# Patient Record
Sex: FEMALE | Race: WHITE
[De-identification: names, ages, dates, MRNs, and addresses within clinical notes are randomized per-mention and may not be internally consistent; named-entity substitution may affect disease eponyms.]

---

## 2017-09-13 ENCOUNTER — HOSPITAL ENCOUNTER (OUTPATIENT)
Dept: HOSPITAL 62 - RAD | Age: 67
End: 2017-09-13
Attending: INTERNAL MEDICINE
Payer: MEDICARE

## 2017-09-13 DIAGNOSIS — K22.2: ICD-10-CM

## 2017-09-13 DIAGNOSIS — R07.9: Primary | ICD-10-CM

## 2017-09-13 PROCEDURE — 71020: CPT

## 2017-09-13 NOTE — RADIOLOGY REPORT (SQ)
EXAM DESCRIPTION:  CHEST PA/LAT



COMPLETED DATE/TIME:  9/13/2017 9:58 am



REASON FOR STUDY:  CHEST PAIN/ESOPHAGEAL OBSTRUCTION



COMPARISON:  CT chest 11/1/2016

AP chest 5/10/2012



EXAM PARAMETERS:  NUMBER OF VIEWS: two views

TECHNIQUE: Digital Frontal and Lateral radiographic views of the chest acquired.

RADIATION DOSE: NA

LIMITATIONS: none



FINDINGS:  LUNGS AND PLEURA: No opacities, masses or pneumothorax. No pleural effusion.

MEDIASTINUM AND HILAR STRUCTURES: No masses or contour abnormalities.

HEART AND VASCULAR STRUCTURES: Heart normal size.  No evidence for failure.

BONES: No acute findings.

HARDWARE: Surgical clips right paratracheal region.

OTHER: No other significant finding.



IMPRESSION:  No acute findings



TECHNICAL DOCUMENTATION:  JOB ID:  [id number]

 2011 [employer]- All Rights Reserved

## 2017-09-19 ENCOUNTER — HOSPITAL ENCOUNTER (OUTPATIENT)
Dept: HOSPITAL 62 - RAD | Age: 67
End: 2017-09-19
Attending: INTERNAL MEDICINE
Payer: MEDICARE

## 2017-09-19 DIAGNOSIS — R07.9: Primary | ICD-10-CM

## 2017-09-19 LAB
ANION GAP SERPL CALC-SCNC: 11 MMOL/L (ref 5–19)
BASOPHILS # BLD AUTO: 0.1 10^3/UL (ref 0–0.2)
BASOPHILS NFR BLD AUTO: 0.6 % (ref 0–2)
BUN SERPL-MCNC: 15 MG/DL (ref 7–20)
CALCIUM: 9.5 MG/DL (ref 8.4–10.2)
CHLORIDE SERPL-SCNC: 105 MMOL/L (ref 98–107)
CO2 SERPL-SCNC: 25 MMOL/L (ref 22–30)
CREAT SERPL-MCNC: 1.08 MG/DL (ref 0.52–1.25)
EOSINOPHIL # BLD AUTO: 0 10^3/UL (ref 0–0.6)
EOSINOPHIL NFR BLD AUTO: 0.1 % (ref 0–6)
ERYTHROCYTE [DISTWIDTH] IN BLOOD BY AUTOMATED COUNT: 14.5 % (ref 11.5–14)
GLUCOSE SERPL-MCNC: 115 MG/DL (ref 75–110)
HCT VFR BLD CALC: 36.3 % (ref 36–47)
HGB BLD-MCNC: 12.5 G/DL (ref 12–15.5)
HGB HCT DIFFERENCE: 1.2
LYMPHOCYTES # BLD AUTO: 0.7 10^3/UL (ref 0.5–4.7)
LYMPHOCYTES NFR BLD AUTO: 7.6 % (ref 13–45)
MCH RBC QN AUTO: 29.3 PG (ref 27–33.4)
MCHC RBC AUTO-ENTMCNC: 34.6 G/DL (ref 32–36)
MCV RBC AUTO: 85 FL (ref 80–97)
MONOCYTES # BLD AUTO: 0.4 10^3/UL (ref 0.1–1.4)
MONOCYTES NFR BLD AUTO: 4.6 % (ref 3–13)
NEUTROPHILS # BLD AUTO: 7.9 10^3/UL (ref 1.7–8.2)
NEUTS SEG NFR BLD AUTO: 87.1 % (ref 42–78)
POTASSIUM SERPL-SCNC: 4.6 MMOL/L (ref 3.6–5)
RBC # BLD AUTO: 4.29 10^6/UL (ref 3.72–5.28)
SODIUM SERPL-SCNC: 141.4 MMOL/L (ref 137–145)
WBC # BLD AUTO: 9.1 10^3/UL (ref 4–10.5)

## 2017-09-19 PROCEDURE — 80048 BASIC METABOLIC PNL TOTAL CA: CPT

## 2017-09-19 PROCEDURE — 36415 COLL VENOUS BLD VENIPUNCTURE: CPT

## 2017-09-19 PROCEDURE — 82565 ASSAY OF CREATININE: CPT

## 2017-09-19 PROCEDURE — 71260 CT THORAX DX C+: CPT

## 2017-09-19 PROCEDURE — 85025 COMPLETE CBC W/AUTO DIFF WBC: CPT

## 2017-09-19 NOTE — RADIOLOGY REPORT (SQ)
EXAM DESCRIPTION:  CT CHEST WITH



COMPLETED DATE/TIME:  9/19/2017 9:04 am



REASON FOR STUDY:  CHEST PAIN (R07.9) R07.9  CHEST PAIN, UNSPECIFIED



COMPARISON:  11/1/2016.



TECHNIQUE:  CT scan of the chest performed using helical scanning technique with dynamic intravenous 
contrast injection.  Images reviewed with lung, soft tissue and bone windows.  Reconstructed coronal 
and sagittal MPR images reviewed.  All images stored on PACS.

All CT scanners at this facility use dose modulation, iterative reconstruction, and/or weight based d
osing when appropriate to reduce radiation dose to as low as reasonably achievable (ALARA).

[facility]: Dose Right  [facility]: CareDose    [facility]: Dose Right    [facility]: Teradose 4D    [facility]: Smart Technologies



CONTRAST TYPE AND DOSE:  contrast/concentration: Isovue 370.00 mg/ml; Total Contrast Delivered: 80.0 
ml; Total Saline Delivered: 55.0 ml



RENAL FUNCTION:  Creatinine 1.1.



RADIATION DOSE:  Up-to-date CT equipment and radiation dose reduction techniques were employed. CTDIv
ol: 5.2 mGy. DLP: 190 mGy-cm. .



LIMITATIONS:  None.



FINDINGS:  LUNGS AND PLEURA: No opacities, nodules, masses.  No pneumothorax. No effusions.

HILAR AND MEDIASTINAL STRUCTURES: No identified masses or abnormal nodes.

HEART AND VASCULAR STRUCTURES: No aneurysm or dissection.  No central pulmonary emboli.  No pericardi
al effusion.

HARDWARE: None in the chest.

UPPER ABDOMEN: No significant findings.  Limited exam.

THYROID AND OTHER SOFT TISSUES: No masses.  No adenopathy.

BONES: No significant finding.

OTHER: No other significant finding.



IMPRESSION:  NORMAL CT OF THE CHEST WITH IV CONTRAST.



TECHNICAL DOCUMENTATION:  JOB ID:  [id number]

Quality ID # 436: Final reports with documentation of one or more dose reduction techniques (e.g., Au
tomated exposure control, adjustment of the mA and/or kV according to patient size, use of iterative 
reconstruction technique)

 2011 [employer]- All Rights Reserved

## 2017-11-21 ENCOUNTER — HOSPITAL ENCOUNTER (EMERGENCY)
Dept: HOSPITAL 62 - ER | Age: 67
Discharge: HOME | End: 2017-11-21
Payer: MEDICARE

## 2017-11-21 VITALS — DIASTOLIC BLOOD PRESSURE: 72 MMHG | SYSTOLIC BLOOD PRESSURE: 131 MMHG

## 2017-11-21 DIAGNOSIS — S51.012A: ICD-10-CM
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
## 2017-11-21 DIAGNOSIS — S30.0XXA: ICD-10-CM

## 2017-11-21 DIAGNOSIS — S50.02XA: Primary | ICD-10-CM
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
## 2017-11-21 PROCEDURE — 99283 EMERGENCY DEPT VISIT LOW MDM: CPT

## 2017-11-21 PROCEDURE — 73080 X-RAY EXAM OF ELBOW: CPT

## 2017-11-21 PROCEDURE — 72220 X-RAY EXAM SACRUM TAILBONE: CPT

## 2017-11-21 NOTE — RADIOLOGY REPORT (SQ)
EXAM DESCRIPTION:  SACRUM AND COCCYX



COMPLETED DATE/TIME:  11/21/2017 3:48 pm



REASON FOR STUDY:  fall/pain



COMPARISON:  None.



NUMBER OF VIEWS:  Three views.



TECHNIQUE:  AP, lateral, and tilt views of the sacrum and coccyx.



LIMITATIONS:  None.



FINDINGS:  MINERALIZATION: Normal.

BONES: No acute fracture or dislocation.  No worrisome bone lesions.

SOFT TISSUES: No soft tissue swelling.  No foreign body.

OTHER: Lower lumbar degenerative disc changes.



IMPRESSION:  No acute abnormality in the sacrum and coccyx.



TECHNICAL DOCUMENTATION:  JOB ID:  [id number]

 2011 [employer]- All Rights Reserved

## 2017-11-21 NOTE — RADIOLOGY REPORT (SQ)
EXAM DESCRIPTION:  ELBOW LEFT OVER 2 VIEWS



COMPLETED DATE/TIME:  11/21/2017 3:48 pm



REASON FOR STUDY:  fall/pain



COMPARISON:  None.



NUMBER OF VIEWS:  Four views.



TECHNIQUE:  AP, lateral, and both oblique radiographic images acquired of the left elbow.



LIMITATIONS:  None.



FINDINGS:  MINERALIZATION: Normal.

BONES: No acute fracture or dislocation.  No worrisome bone lesions.

JOINT: No effusion.

SOFT TISSUES: No soft tissue swelling.  No foreign body.

OTHER: No other significant finding.



IMPRESSION:  NEGATIVE STUDY OF THE LEFT ELBOW. NO RADIOGRAPHIC EVIDENCE OF ACUTE INJURY.



TECHNICAL DOCUMENTATION:  JOB ID:  [id number]

 2011 [employer]- All Rights Reserved

## 2017-11-21 NOTE — ER DOCUMENT REPORT
ED Fall





- General


Chief Complaint: Fall Injury


Stated Complaint: FALL


Time Seen by Provider: 17 15:18


Mode of Arrival: Ambulatory


Information source: Patient


Notes: 





Patient states that she had a fall at home.  She states she lost her balance 

and is not sure exactly what made her lose her balance.  She denies any loss of 

consciousness.  States she was not dizzy.  She states she suffered an injury to 

her elbow and coccyx as she was falling because she had several objects in the 

kitchen.  The pain is constant.  It does not radiate.  It is worse with 

movement and better with rest.  It is a dull aching pain in both the elbow and 

coccyx.  Symptoms are moderate.


TRAVEL OUTSIDE OF THE [place] IN LAST 30 DAYS: No





- Related data


Allergies/Adverse Reactions: 


 





Sulfa (Sulfonamide Antibiotics) Allergy (Verified 17 15:02)


 


dial soap Allergy (Mild, Uncoded 17 15:02)


 Hives








Home Medications: 


 Current Home Medications





Omeprazole [Omeprazole] 40 mg PO BID 17 [History]











Past Medical History





- General


Information source: Patient





- Social History


Smoking Status: Never Smoker


Chew tobacco use (# tins/day): No


Frequency of alcohol use: Occasional


Drug Abuse: None


Family History: Reviewed & Not Pertinent


Patient has suicidal ideation: No


Patient has homicidal ideation: No





- Past Medical History


Cardiac Medical History: 


   Denies: Hx Heart Attack, Hx Hypertension


Pulmonary Medical History: Reports: Hx Asthma - medicated, Hx Bronchitis, Hx 

COPD


   Denies: Hx Tuberculosis


Neurological Medical History: Denies: Hx Cerebrovascular Accident, Hx Seizures


Renal/ Medical History: Denies: Hx Peritoneal Dialysis


GI Medical History: Denies: Hx Hepatitis, Hx Hiatal Hernia, Hx Ulcer


Skin Medical History: Reports Hx Eczema


Infectious Medical History: Denies: Hx Hepatitis


Past Surgical History: Reports: Hx  Section - x1, Hx Hysterectomy, Hx 

Orthopedic Surgery - 5th metatarsal removed.  Denies: Hx Mastectomy, Hx Open 

Heart Surgery, Hx Pacemaker





- Immunizations


Hx Pneumococcal Vaccination: 05/10/12





Review of Systems





- Review of Systems


Constitutional: denies: Chills, Fever


Cardiovascular: denies: Chest pain, Palpitations


Respiratory: denies: Cough, Short of breath


-: Yes All other systems reviewed and negative





Physical Exam





- Vital signs


Vitals: 





 











Temp Pulse Resp BP Pulse Ox


 


 97.8 F   90   18   113/71   97 


 


 11/21/17 15:02  17 15:02  17 15:02  17 15:02  17 15:02











Interpretation: Normal





- General


General appearance: Appears well, Alert


In distress: None





- HEENT


Head: Normocephalic, Atraumatic


Eyes: Normal


Pupils: PERRL





- Respiratory


Respiratory status: No respiratory distress


Chest status: Nontender


Breath sounds: Normal


Chest palpation: Normal





- Cardiovascular


Rhythm: Regular


Heart sounds: Normal auscultation


Murmur: No





- Abdominal


Inspection: Normal


Distension: No distension


Bowel sounds: Normal


Tenderness: Nontender


Organomegaly: No organomegaly





- Back


Back: Normal, Tender - Patient's sacrum and coccyx are diffusely tender to 

palpation





- Extremities


General upper extremity: Normal ROM, Normal temperature, Other - Left elbow has 

full range of motion but is tender to palpation diffusely.


General lower extremity: Normal inspection, Nontender, Normal color, Normal ROM

, Normal temperature, Normal weight bearing.  No: Sherry's sign





- Neurological


Neuro grossly intact: Yes


Cognition: Normal


Orientation: AAOx4


[place] Coma Scale Eye Opening: Spontaneous


[place] Coma Scale Verbal: Oriented


[employer] Coma Scale Motor: Obeys Commands


[place] Coma Scale Total: 15


Speech: Normal


Motor strength normal: LUE, RUE, LLE, RLE


Sensory: Normal





- Psychological


Associated symptoms: Normal affect, Normal mood





- Skin


Skin Temperature: Warm


Skin Moisture: Dry


Skin Color: Other - Patient has a skin tear to the left lateral elbow.  

Bleeding is controlled.





Course





- Vital Signs


Vital signs: 





 











Temp Pulse Resp BP Pulse Ox


 


 97.8 F   90   18   113/71   97 


 


 17 15:02  17 15:02  17 15:02  17 15:02  17 15:02














- Diagnostic Test


Radiology reviewed: Image reviewed, Reports reviewed - I reviewed patient's x-

rays and there is no evidence of fracture or dislocation of the elbow sacrum or 

coccyx

## 2018-03-21 ENCOUNTER — HOSPITAL ENCOUNTER (INPATIENT)
Dept: HOSPITAL 62 - ER | Age: 68
LOS: 6 days | Discharge: HOME HEALTH | DRG: 194 | End: 2018-03-27
Attending: STUDENT IN AN ORGANIZED HEALTH CARE EDUCATION/TRAINING PROGRAM | Admitting: STUDENT IN AN ORGANIZED HEALTH CARE EDUCATION/TRAINING PROGRAM
Payer: MEDICARE

## 2018-03-21 DIAGNOSIS — I10: ICD-10-CM

## 2018-03-21 DIAGNOSIS — R07.89: ICD-10-CM

## 2018-03-21 DIAGNOSIS — J18.1: Primary | ICD-10-CM

## 2018-03-21 DIAGNOSIS — R40.4: ICD-10-CM

## 2018-03-21 DIAGNOSIS — Z87.891: ICD-10-CM

## 2018-03-21 DIAGNOSIS — Z79.51: ICD-10-CM

## 2018-03-21 DIAGNOSIS — N17.9: ICD-10-CM

## 2018-03-21 DIAGNOSIS — M54.9: ICD-10-CM

## 2018-03-21 LAB
ABSOLUTE LYMPHOCYTES# (MANUAL): 1.4 10^3/UL (ref 0.5–4.7)
ABSOLUTE MONOCYTES # (MANUAL): 1.4 10^3/UL (ref 0.1–1.4)
ABSOLUTE NEUTROPHILS# (MANUAL): 31 10^3/UL (ref 1.7–8.2)
ADD MANUAL DIFF: YES
ALBUMIN SERPL-MCNC: 4.2 G/DL (ref 3.5–5)
ALP SERPL-CCNC: 94 U/L (ref 38–126)
ALT SERPL-CCNC: 30 U/L (ref 9–52)
ANION GAP SERPL CALC-SCNC: 10 MMOL/L (ref 5–19)
AST SERPL-CCNC: 25 U/L (ref 14–36)
BASOPHILS NFR BLD MANUAL: 0 % (ref 0–2)
BILIRUB DIRECT SERPL-MCNC: 0.4 MG/DL (ref 0–0.4)
BILIRUB SERPL-MCNC: 0.5 MG/DL (ref 0.2–1.3)
BUN SERPL-MCNC: 32 MG/DL (ref 7–20)
CALCIUM: 9.9 MG/DL (ref 8.4–10.2)
CHLORIDE SERPL-SCNC: 101 MMOL/L (ref 98–107)
CK MB SERPL-MCNC: 1.54 NG/ML (ref ?–4.55)
CO2 SERPL-SCNC: 26 MMOL/L (ref 22–30)
EOSINOPHIL NFR BLD MANUAL: 1 % (ref 0–6)
ERYTHROCYTE [DISTWIDTH] IN BLOOD BY AUTOMATED COUNT: 13.8 % (ref 11.5–14)
GLUCOSE SERPL-MCNC: 89 MG/DL (ref 75–110)
HCT VFR BLD CALC: 40.3 % (ref 36–47)
HGB BLD-MCNC: 13.2 G/DL (ref 12–15.5)
MCH RBC QN AUTO: 28.9 PG (ref 27–33.4)
MCHC RBC AUTO-ENTMCNC: 32.7 G/DL (ref 32–36)
MCV RBC AUTO: 88 FL (ref 80–97)
MONOCYTES % (MANUAL): 4 % (ref 3–13)
OVALOCYTES BLD QL SMEAR: SLIGHT
PLATELET # BLD: 355 10^3/UL (ref 150–450)
PLATELET COMMENT: ADEQUATE
POIKILOCYTOSIS BLD QL SMEAR: SLIGHT
POTASSIUM SERPL-SCNC: 4.3 MMOL/L (ref 3.6–5)
PROT SERPL-MCNC: 7.5 G/DL (ref 6.3–8.2)
RBC # BLD AUTO: 4.56 10^6/UL (ref 3.72–5.28)
SEGMENTED NEUTROPHILS % (MAN): 91 % (ref 42–78)
SODIUM SERPL-SCNC: 136.7 MMOL/L (ref 137–145)
TOTAL CELLS COUNTED BLD: 100
TOXIC GRANULES BLD QL SMEAR: SLIGHT
TROPONIN I SERPL-MCNC: < 0.012 NG/ML
VARIANT LYMPHS NFR BLD MANUAL: 2 % (ref 13–45)
WBC # BLD AUTO: 34.1 10^3/UL (ref 4–10.5)
WBC TOXIC VACUOLES BLD QL SMEAR: PRESENT

## 2018-03-21 PROCEDURE — 96366 THER/PROPH/DIAG IV INF ADDON: CPT

## 2018-03-21 PROCEDURE — 83735 ASSAY OF MAGNESIUM: CPT

## 2018-03-21 PROCEDURE — 87070 CULTURE OTHR SPECIMN AEROBIC: CPT

## 2018-03-21 PROCEDURE — 87040 BLOOD CULTURE FOR BACTERIA: CPT

## 2018-03-21 PROCEDURE — 94668 MNPJ CHEST WALL SBSQ: CPT

## 2018-03-21 PROCEDURE — 99285 EMERGENCY DEPT VISIT HI MDM: CPT

## 2018-03-21 PROCEDURE — 84484 ASSAY OF TROPONIN QUANT: CPT

## 2018-03-21 PROCEDURE — 96365 THER/PROPH/DIAG IV INF INIT: CPT

## 2018-03-21 PROCEDURE — 70450 CT HEAD/BRAIN W/O DYE: CPT

## 2018-03-21 PROCEDURE — 93005 ELECTROCARDIOGRAM TRACING: CPT

## 2018-03-21 PROCEDURE — 94640 AIRWAY INHALATION TREATMENT: CPT

## 2018-03-21 PROCEDURE — 80048 BASIC METABOLIC PNL TOTAL CA: CPT

## 2018-03-21 PROCEDURE — 87077 CULTURE AEROBIC IDENTIFY: CPT

## 2018-03-21 PROCEDURE — 82553 CREATINE MB FRACTION: CPT

## 2018-03-21 PROCEDURE — 80053 COMPREHEN METABOLIC PANEL: CPT

## 2018-03-21 PROCEDURE — 71045 X-RAY EXAM CHEST 1 VIEW: CPT

## 2018-03-21 PROCEDURE — 96361 HYDRATE IV INFUSION ADD-ON: CPT

## 2018-03-21 PROCEDURE — 71275 CT ANGIOGRAPHY CHEST: CPT

## 2018-03-21 PROCEDURE — 82550 ASSAY OF CK (CPK): CPT

## 2018-03-21 PROCEDURE — 87205 SMEAR GRAM STAIN: CPT

## 2018-03-21 PROCEDURE — 36415 COLL VENOUS BLD VENIPUNCTURE: CPT

## 2018-03-21 PROCEDURE — 94667 MNPJ CHEST WALL 1ST: CPT

## 2018-03-21 PROCEDURE — 93010 ELECTROCARDIOGRAM REPORT: CPT

## 2018-03-21 PROCEDURE — 85025 COMPLETE CBC W/AUTO DIFF WBC: CPT

## 2018-03-21 PROCEDURE — 74175 CTA ABDOMEN W/CONTRAST: CPT

## 2018-03-21 PROCEDURE — 96375 TX/PRO/DX INJ NEW DRUG ADDON: CPT

## 2018-03-21 PROCEDURE — 93880 EXTRACRANIAL BILAT STUDY: CPT

## 2018-03-21 PROCEDURE — 84100 ASSAY OF PHOSPHORUS: CPT

## 2018-03-21 PROCEDURE — 85027 COMPLETE CBC AUTOMATED: CPT

## 2018-03-21 RX ADMIN — MORPHINE SULFATE PRN MG: 10 INJECTION INTRAMUSCULAR; INTRAVENOUS; SUBCUTANEOUS at 19:47

## 2018-03-21 RX ADMIN — HYDROCODONE BITARTRATE AND ACETAMINOPHEN PRN TAB: 5; 325 TABLET ORAL at 21:47

## 2018-03-21 RX ADMIN — GUAIFENESIN PRN MG: 200 SOLUTION ORAL at 21:47

## 2018-03-21 RX ADMIN — AZITHROMYCIN MONOHYDRATE SCH MG: 500 INJECTION, POWDER, LYOPHILIZED, FOR SOLUTION INTRAVENOUS at 22:23

## 2018-03-21 RX ADMIN — IPRATROPIUM BROMIDE AND ALBUTEROL SULFATE SCH ML: 2.5; .5 SOLUTION RESPIRATORY (INHALATION) at 19:48

## 2018-03-21 RX ADMIN — Medication SCH ML: at 21:50

## 2018-03-21 RX ADMIN — SODIUM CHLORIDE PRN ML: 0.45 INJECTION, SOLUTION INTRAVENOUS at 21:47

## 2018-03-21 NOTE — ER DOCUMENT REPORT
ED Medical Screen (RME)





- General


Chief Complaint: Back Pain


Stated Complaint: BACK PAIN


Time Seen by Provider: 18 14:46


Notes: 





Patient states she is having severe left-sided chest pain that started when she 

rolled over in bed.


TRAVEL OUTSIDE OF THE [place] IN LAST 30 DAYS: No





- Related Data


Allergies/Adverse Reactions: 


 





Sulfa (Sulfonamide Antibiotics) Allergy (Verified 18 14:24)


 


dial soap Allergy (Mild, Uncoded 18 14:24)


 Hives











Past Medical History





- Past Medical History


Cardiac Medical History: 


   Denies: Hx Heart Attack, Hx Hypertension


Pulmonary Medical History: Reports: Hx Asthma - medicated, Hx Bronchitis, Hx 

COPD


   Denies: Hx Tuberculosis


Neurological Medical History: Denies: Hx Cerebrovascular Accident, Hx Seizures


Renal/ Medical History: Denies: Hx Peritoneal Dialysis


GI Medical History: Denies: Hx Hepatitis, Hx Hiatal Hernia, Hx Ulcer


Skin Medical History: Reports Hx Eczema


Infectious Medical History: Denies: Hx Hepatitis


Past Surgical History: Reports: Hx  Section - x1, Hx Hysterectomy, Hx 

Orthopedic Surgery - 5th metatarsal removed.  Denies: Hx Mastectomy, Hx Open 

Heart Surgery, Hx Pacemaker





Physical Exam





- Vital signs


Vitals: 





 











Temp Pulse Resp BP Pulse Ox


 


 98.4 F   112 H  28 H  109/71   95 


 


 18 14:35  18 14:35  18 14:35  18 14:35  18 14:35














Course





- Vital Signs


Vital signs: 





 











Temp Pulse Resp BP Pulse Ox


 


 98.4 F   112 H  28 H  109/71   95 


 


 18 14:35  18 14:35  18 14:35  18 14:35  18 14:35

## 2018-03-21 NOTE — RADIOLOGY REPORT (SQ)
EXAM DESCRIPTION:  CHEST SINGLE VIEW



COMPLETED DATE/TIME:  3/21/2018 3:17 pm



REASON FOR STUDY:  CHEST   BACK PAIN, LEFT SIDE



COMPARISON:  CT chest 9/19/2017, 11/1/2016

Two-view chest 9/13/2017



EXAM PARAMETERS:  NUMBER OF VIEWS: One view.

TECHNIQUE: Single frontal radiographic view of the chest acquired.

RADIATION DOSE: NA

LIMITATIONS: None.



FINDINGS:  LUNGS AND PLEURA: Left basilar airspace disease is present, worrisome for pneumonia.

Lungs are otherwise hyperinflated and hyperlucent but free of focal infiltrates.  No pleural effusion
.  No pneumothorax.

MEDIASTINUM AND HILAR STRUCTURES: No masses.  Contour normal.

HEART AND VASCULAR STRUCTURES: Heart normal in size.  Normal vasculature.

BONES: No acute findings.

HARDWARE: None in the chest.

OTHER: No other significant finding.



IMPRESSION:  Left lower lobe airspace disease worrisome for pneumonia



TECHNICAL DOCUMENTATION:  JOB ID:  [id number]

 2011 [employer]- All Rights Reserved



Reading location - IP/workstation name: [email or web address]

## 2018-03-21 NOTE — ER DOCUMENT REPORT
ED Cardiac





- General


Chief Complaint: Back Pain


Stated Complaint: BACK PAIN


Time Seen by Provider: 18 14:46


Mode of Arrival: Wheelchair


Information source: Patient


TRAVEL OUTSIDE OF THE [place] IN LAST 30 DAYS: No





- HPI


Patient complains to provider of: Chest pain - LEFT POSTERIOR, Shortness of 

breath, Other - BACK PAIN


Was the onset of pain: Sudden


When did pain begin: 0300


Is the pain a: New problem


Chest pain location: Back - LOWER LEFT


Quality of pain: Severe, Tearing


Chest pain radiation location: Back


Severity now: Moderate


Severity at worst: Severe


Chest pain precipitating factors: At Rest - GOT UP FROM BED TO GO TO BATHROOM


Cardiac risk factors: Hypertension, Smoker - FORMERLY


Positive cardiac history: Yes


Associated symptoms: Shortness of breath.  denies: Diaphoresis, Nausea/vomiting


Exacerbated by: Deep breaths, Torso movement


Relieved by: Nothing


Similar symptoms previously: No


Recently seen / treated by doctor: No





- Related Data


Allergies/Adverse Reactions: 


 





Sulfa (Sulfonamide Antibiotics) Allergy (Verified 18 14:24)


 


dial soap Allergy (Mild, Uncoded 18 14:24)


 Hives











Past Medical History





- Social History


Smoking Status: Unknown if Ever Smoked


Frequency of alcohol use: None


Drug Abuse: None


Family History: Reviewed & Not Pertinent


Patient has suicidal ideation: No


Patient has homicidal ideation: No





- Past Medical History


Cardiac Medical History: 


   Denies: Hx Heart Attack, Hx Hypertension


Pulmonary Medical History: Reports: Hx Asthma - medicated, Hx Bronchitis, Hx 

COPD


   Denies: Hx Tuberculosis


Neurological Medical History: Denies: Hx Cerebrovascular Accident, Hx Seizures


Renal/ Medical History: Denies: Hx Peritoneal Dialysis


GI Medical History: Denies: Hx Hepatitis, Hx Hiatal Hernia, Hx Ulcer


Skin Medical History: Reports Hx Eczema


Infectious Medical History: Denies: Hx Hepatitis


Past Surgical History: Reports: Hx  Section - x1, Hx Hysterectomy, Hx 

Orthopedic Surgery - 5th metatarsal removed.  Denies: Hx Mastectomy, Hx Open 

Heart Surgery, Hx Pacemaker





- Immunizations


Hx Pneumococcal Vaccination: 05/10/12





Physical Exam





- Vital signs


Vitals: 


 











Temp Pulse Resp BP Pulse Ox


 


 98.4 F   112 H  28 H  109/71   95 


 


 18 14:35  18 14:35  18 14:35  18 14:35  18 14:35











Interpretation: Tachycardic, Tachypneic.  No: Hypertensive





- General


General appearance: Appears well, Alert


In distress: None





- HEENT


Head: Normocephalic


Eyes: Normal


Conjunctiva: Normal


Ears: Normal


Nasal: Normal


Mouth/Lips: Normal


Mucous membranes: Normal


Neck: Normal





- Respiratory


Respiratory status: Tachypnea


Chest status: Nontender


Breath sounds: Wheezing - ALL FIELDS





- Cardiovascular


Rhythm: Regular


Heart sounds: Normal auscultation


Murmur: No





- Abdominal


Inspection: Normal


Distension: No distension





- Back


Back: Normal, Nontender





- Extremities


General upper extremity: Normal inspection


General lower extremity: Normal inspection.  No: Tender, Edema





- Neurological


Neuro grossly intact: Yes


Cognition: Normal


Orientation: AAOx4





- Psychological


Associated symptoms: Normal affect, Normal mood





- Skin


Skin Temperature: Warm


Skin Moisture: Dry


Skin Color: Normal


Skin Turgor: Elastic





Course





- Vital Signs


Vital signs: 


 











Temp Pulse Resp BP Pulse Ox


 


 98.4 F   112 H  25 H  105/71   97 


 


 18 14:35  18 14:35  18 18:00  18 17:01  18 18:00














- Laboratory


Result Diagrams: 


 18 14:55





 18 14:55


Laboratory results interpreted by me: 


 











  18





  14:55 14:55


 


WBC  34.1 H* 


 


Seg Neuts % (Manual)  91 H 


 


Lymphocytes % (Manual)  2 L 


 


Abs Neuts (Manual)  31.0 H 


 


Sodium   136.7 L


 


BUN   32 H


 


Creatinine   1.30 H


 


Est GFR ( Amer)   49 L


 


Est GFR (Non-Af Amer)   41 L














- Diagnostic Test


Radiology reviewed: Image reviewed, Reports reviewed





- EKG Interpretation by Me


EKG shows normal: Sinus rhythm, Axis, Intervals, QRS Complexes, ST-T Waves


Rate: Tachycardia


P Waves: LAE





- Consults


  ** DR. JEFFREY


Time consulted: 16:52


Consulted provider: will come to ER





Discharge





- Discharge


Clinical Impression: 


 Pneumonia, Pleuritic chest pain





Condition: Fair


Disposition: ADMITTED AS INPATIENT


Admitting Provider: Hospitalist


Unit Admitted: Telemetry

## 2018-03-21 NOTE — RADIOLOGY REPORT (SQ)
EXAM DESCRIPTION:  CTA CHEST; CTA ABDOMEN



COMPLETED DATE/TIME:  3/21/2018 4:01 pm



REASON FOR STUDY:  CHEST   BACK PAIN, R/O AORTIC DISSECTION; R/O AORTIC DISSECTION



COMPARISON:  CT chest 9/19/2017, 11/1/2016

CT abdomen pelvis 9/12/2016

Two-view chest 3/21/2018



CONTRAST TYPE AND DOSE:  contrast/concentration: Isovue 370.00 mg/ml; Total Contrast Delivered: 78.0 
ml; Total Saline Delivered: 90.0 ml



RENAL FUNCTION:  Creatinine 1.3



TECHNIQUE:  CT angio of the chest performed using helical scanning technique with dynamic intravenous
 contrast injection.  Images reviewed with lung, soft tissue and bone windows. Reconstructed coronal 
and sagittal MPR images reviewed through the pulmonary arteries and thoracic aorta.  All images store
d on PACS.

CT angio of the abdomen and pelvis performed with intravenous and without oral contrastusing helical 
scanning technique with dynamic intravenous contrast injection.  Images reviewed with lung, soft tiss
ue and bone windows.  Reconstructed coronal and sagittal MPR images reviewed.  Delayed images for clinton
luation of the urinary system also acquired and evaluated. All images stored on PACS.

All CT scanners at this facility use dose modulation, iterative reconstruction, and/or weight based d
osing when appropriate to reduce radiation dose to as low as reasonably achievable (ALARA).

[facility]: Dose Right  [facility]: CareDose    [facility]: Dose Right    [facility]: Teradose 4D    [facility]: [employer]



RADIATION DOSE:  CT Rad equipment meets quality standard of care and radiation dose reduction techniq
ues were employed. CTDIvol: 10.5 - 16.1 mGy. DLP: 1115 mGy-cm. .



LIMITATIONS:  None.



FINDINGS:  CHEST:

LUNGS AND PLEURA: There is dense consolidation in the left lower lobe just above the hemidiaphragm fr
om pneumonia.  Lungs are otherwise hyperinflated and hyperlucent from obstructive disease.  No pleura
l effusions.  No pneumothorax.

HILAR AND MEDIASTINAL STRUCTURES: No bulky adenopathy.  Airways are patent.  No mediastinal mass.

HEART AND VASCULAR STRUCTURES: No thoracic aortic aneurysm or dissection.  No central pulmonary embol
i.  No pericardial effusion.  No emboli to the pulmonary arteries.

HARDWARE: None.

THYROID AND OTHER SOFT TISSUES: No masses.  No adenopathy.

BONES: No significant finding.

OTHER: No other significant finding.

ABDOMEN AND PELVIS:

LIVER: Normal size. No masses.  No dilated ducts.

SPLEEN: Normal size. No focal lesions.

PANCREAS: No masses. No significant calcifications. No adjacent inflammation or peripancreatic fluid 
collections. Pancreatic duct not dilated.

GALLBLADDER: No identified stones by CT criteria. No inflammatory changes to suggest cholecystitis.

ADRENAL GLANDS: No significant masses or asymmetry.

KIDNEYS AND URETERS:  Bilateral renal cortical thinning from medical renal disease.  No solid masses.
 No significant calcification. No hydronephrosis or hydroureter.

AORTA AND VESSELS: No abdominal aortic aneurysm. No dissection. Renal arteries, celiac without stenos
is.  Greater than 50% proximal SMA stenosis is evident on the sagittal reconstructions.

RETROPERITONEUM: No retroperitoneal adenopathy, hemorrhage or masses.

BOWEL AND PERITONEAL CAVITY: No masses or inflammatory changes. No free fluid or peritoneal masses.  
No oral contrast.  No gross evidence of bowel obstruction.  Scattered descending and sigmoid colon di
verticuli.

APPENDIX: Normal.

ABDOMINAL WALL: No masses. No hernias.

PELVIS: Incompletely included in the field of view.  No gross masses or adenopathy.

BONES: No acute fracture.  Degenerative vertebral body endplate changes at L4-5 and L5-S1

OTHER: No other significant finding.



IMPRESSION:  No CT angio evidence of acute pulmonary emboli.

No CT angio evidence of thoracic or abdominal aortic dissection.  Greater than 50% narrowing proximal
 SMA.



TECHNICAL DOCUMENTATION:  JOB ID:  [id number]

Quality ID # 436: Final reports with documentation of one or more dose reduction techniques (e.g., Au
tomated exposure control, adjustment of the mA and/or kV according to patient size, use of iterative 
reconstruction technique)

 2011 [employer]- All Rights Reserved



Reading location - IP/workstation name: [facility]

## 2018-03-21 NOTE — RADIOLOGY REPORT (SQ)
EXAM DESCRIPTION:  CTA CHEST; CTA ABDOMEN



COMPLETED DATE/TIME:  3/21/2018 4:01 pm



REASON FOR STUDY:  CHEST   BACK PAIN, R/O AORTIC DISSECTION; R/O AORTIC DISSECTION



COMPARISON:  CT chest 9/19/2017, 11/1/2016

CT abdomen pelvis 9/12/2016

Two-view chest 3/21/2018



CONTRAST TYPE AND DOSE:  contrast/concentration: Isovue 370.00 mg/ml; Total Contrast Delivered: 78.0 
ml; Total Saline Delivered: 90.0 ml



RENAL FUNCTION:  Creatinine 1.3



TECHNIQUE:  CT angio of the chest performed using helical scanning technique with dynamic intravenous
 contrast injection.  Images reviewed with lung, soft tissue and bone windows. Reconstructed coronal 
and sagittal MPR images reviewed through the pulmonary arteries and thoracic aorta.  All images store
d on PACS.

CT angio of the abdomen and pelvis performed with intravenous and without oral contrastusing helical 
scanning technique with dynamic intravenous contrast injection.  Images reviewed with lung, soft tiss
ue and bone windows.  Reconstructed coronal and sagittal MPR images reviewed.  Delayed images for clinton
luation of the urinary system also acquired and evaluated. All images stored on PACS.

All CT scanners at this facility use dose modulation, iterative reconstruction, and/or weight based d
osing when appropriate to reduce radiation dose to as low as reasonably achievable (ALARA).

[facility]: Dose Right  [facility]: CareDose    [facility]: Dose Right    [facility]: Teradose 4D    [facility]: [employer]



RADIATION DOSE:  CT Rad equipment meets quality standard of care and radiation dose reduction techniq
ues were employed. CTDIvol: 10.5 - 16.1 mGy. DLP: 1115 mGy-cm. .



LIMITATIONS:  None.



FINDINGS:  CHEST:

LUNGS AND PLEURA: There is dense consolidation in the left lower lobe just above the hemidiaphragm fr
om pneumonia.  Lungs are otherwise hyperinflated and hyperlucent from obstructive disease.  No pleura
l effusions.  No pneumothorax.

HILAR AND MEDIASTINAL STRUCTURES: No bulky adenopathy.  Airways are patent.  No mediastinal mass.

HEART AND VASCULAR STRUCTURES: No thoracic aortic aneurysm or dissection.  No central pulmonary embol
i.  No pericardial effusion.  No emboli to the pulmonary arteries.

HARDWARE: None.

THYROID AND OTHER SOFT TISSUES: No masses.  No adenopathy.

BONES: No significant finding.

OTHER: No other significant finding.

ABDOMEN AND PELVIS:

LIVER: Normal size. No masses.  No dilated ducts.

SPLEEN: Normal size. No focal lesions.

PANCREAS: No masses. No significant calcifications. No adjacent inflammation or peripancreatic fluid 
collections. Pancreatic duct not dilated.

GALLBLADDER: No identified stones by CT criteria. No inflammatory changes to suggest cholecystitis.

ADRENAL GLANDS: No significant masses or asymmetry.

KIDNEYS AND URETERS:  Bilateral renal cortical thinning from medical renal disease.  No solid masses.
 No significant calcification. No hydronephrosis or hydroureter.

AORTA AND VESSELS: No abdominal aortic aneurysm. No dissection. Renal arteries, celiac without stenos
is.  Greater than 50% proximal SMA stenosis is evident on the sagittal reconstructions.

RETROPERITONEUM: No retroperitoneal adenopathy, hemorrhage or masses.

BOWEL AND PERITONEAL CAVITY: No masses or inflammatory changes. No free fluid or peritoneal masses.  
No oral contrast.  No gross evidence of bowel obstruction.  Scattered descending and sigmoid colon di
verticuli.

APPENDIX: Normal.

ABDOMINAL WALL: No masses. No hernias.

PELVIS: Incompletely included in the field of view.  No gross masses or adenopathy.

BONES: No acute fracture.  Degenerative vertebral body endplate changes at L4-5 and L5-S1

OTHER: No other significant finding.



IMPRESSION:  No CT angio evidence of acute pulmonary emboli.

No CT angio evidence of thoracic or abdominal aortic dissection.  Greater than 50% narrowing proximal
 SMA.



TECHNICAL DOCUMENTATION:  JOB ID:  [id number]

Quality ID # 436: Final reports with documentation of one or more dose reduction techniques (e.g., Au
tomated exposure control, adjustment of the mA and/or kV according to patient size, use of iterative 
reconstruction technique)

 2011 [employer]- All Rights Reserved



Reading location - IP/workstation name: [facility]

## 2018-03-21 NOTE — EKG REPORT
SEVERITY:- BORDERLINE ECG -

SINUS TACHYCARDIA

PROBABLE LEFT ATRIAL ABNORMALITY

:

Confirmed by: Louie Vicente 21-Mar-2018 22:59:40

## 2018-03-21 NOTE — PDOC H&P
History of Present Illness


Admission Date/PCP: 


  3/21/18 at 17:00pm





  LA TORRES MD





Patient complains of: SOB, back pain, cough


History of Present Illness: 


PATRICIO NAJERA is a 67 year old female with history of asthma/COPD who presents 

with 1 day history of acute back pain. Patient states that for 6 days she has 

been taking Prednisone and Azithromycin since the pollen has been worse -- this 

is prescribed by her pulmonologist and she usually has to do this every year. 

This is in the setting of cough with thick green sputum production and 

wheezing. Per pulmonologist also has her use supplemental O2 around the clock, 

however she is unsure how much she is actually using. However this AM, she woke 

up around 3 am with excruciating lower left side back pain. She thought she 

strained a muscle and took 2 tablets of her home [place], however this did not 

provide any relief. Due to persistent symptoms, she called EMS. Denies fevers, 

chills, abdominal pain, NV. She notes occasional chest pain with deep breaths. 

Feels that she is unable to take full deep breath due to pain. Denies sick 

contacts. Due to back pain, CTA chest and abdomen were obtained for initial 

concern for AAA dissection. However, CTA chest was notable for dense left lower 

lobe consolidation above the diaphragm, concerning for PNA. Her ED labs were 

notable for elevated WBC (34.1 with left shift) and elevated renal Cr (1.3). 

Patient was on 2L NC at time of my examination. 





Admitted to Hospitalist service for inpatient management of PNA, OSKAR, and 

pleuretic chest pain. 








Past Medical History


Cardiac Medical History: 


   Denies: Myocardial Infarction, Hypertension


Pulmonary Medical History: Reports: Asthma - medicated, Bronchitis, Chronic 

Obstructive Pulmonary Disease (COPD)


   Denies: Tuberculosis


Neurological Medical History: 


   Denies: Seizures


GI Medical History: 


   Denies: Hepatitis, Hiatal Hernia


Skin Medical History: Reports: Eczema


Hematology: 


   Denies: Anemia, Sickle Cell Disease





Past Surgical History


Past Surgical History: Reports:  Section - x1, Hysterectomy, Orthopedic 

Surgery - 5th metatarsal removed


   Denies: Amputation, Mastectomy, Pacemaker





Social History


Information Source: Patient


Smoking Status: Former Smoker


Frequency of Alcohol Use: Occasional


Hx Recreational Drug Use: No


Drugs: None


Hx Prescription Drug Abuse: No


Past Social History Note: 


Retired, worked in [email or web address] in [place], [place]


Lives with  at home





Family History


Family History: Reviewed & Not Pertinent


Parental Family History Reviewed: No


Children Family History Reviewed: NA


Sibling(s) Family History Reviewed.: NA





Medication/Allergy


Home Medications: 








Budesonide/Formoterol Fumarate [Symbicort -4.5 mcg Inhaler 6 gm] 2 puff 

IH DAILY 13 


Tiotropium Bromide [Spiriva Handihaler 18 mcg/dose (30 Dose)] 1 cap IH DAILY  


Oxycodone HCl/Acetaminophen [Percocet 5-325 mg Tablet] 1 tab PO ASDIR PRN #12 

tablet 16 


Hydrocodone/Acetaminophen [Norco 5-325 mg Tablet] 1 tab PO Q6 PRN #16 tablet  


Omeprazole [Omeprazole] 40 mg PO BID 17 








Allergies/Adverse Reactions: 


 





Sulfa (Sulfonamide Antibiotics) Allergy (Verified 18 14:24)


 


dial soap Allergy (Mild, Uncoded 18 14:24)


 Hives











Review of Systems


All systems: reviewed and no additional remarkable complaints except as stated





Physical Exam


Vital Signs: 


 











Temp Pulse Resp BP Pulse Ox


 


 98.4 F   112 H  22 H  106/81   94 


 


 18 14:35  18 14:35  18 15:12  18 15:12  18 15:12








 Intake & Output











 18





 06:59 06:59 06:59


 


Weight   58.967 kg











General appearance: PRESENT: cooperative, mild distress - Secondary to pain 

with deep breaths, well-developed, well-nourished


Head exam: PRESENT: atraumatic, normocephalic


Mouth exam: PRESENT: moist


Respiratory exam: PRESENT: tachypnea, wheezes - Audible wheees


Cardiovascular exam: PRESENT: +S1, +S2, tachycardia


GI/Abdominal exam: PRESENT: soft, tenderness - Left lower back/flank


Musculoskeletal exam: PRESENT: full ROM


Neurological exam: PRESENT: alert, awake, oriented to person, oriented to place

, oriented to time, CN II-XII grossly intact


Psychiatric exam: PRESENT: appropriate affect, normal mood





Results


Laboratory Results: 


 





 18 14:55 





 18 14:55 





 











  18





  14:55 14:55


 


WBC  34.1 H* 


 


RBC  4.56 


 


Hgb  13.2 


 


Hct  40.3 


 


MCV  88 


 


MCH  28.9 


 


MCHC  32.7 


 


RDW  13.8 


 


Plt Count  355 


 


Seg Neutrophils %  Not Reportable 


 


Lymphocytes %  Not Reportable 


 


Monocytes %  Not Reportable 


 


Eosinophils %  Not Reportable 


 


Basophils %  Not Reportable 


 


Absolute Neutrophils  Not Reportable 


 


Absolute Lymphocytes  Not Reportable 


 


Absolute Monocytes  Not Reportable 


 


Absolute Eosinophils  Not Reportable 


 


Absolute Basophils  Not Reportable 


 


Sodium   136.7 L


 


Potassium   4.3


 


Chloride   101


 


Carbon Dioxide   26


 


Anion Gap   10


 


BUN   32 H


 


Creatinine   1.30 H


 


Est GFR ( Amer)   49 L


 


Est GFR (Non-Af Amer)   41 L


 


Glucose   89


 


Calcium   9.9


 


Total Bilirubin   0.5


 


AST   25


 


ALT   30


 


Alkaline Phosphatase   94


 


Total Protein   7.5


 


Albumin   4.2








 











  18





  14:55 14:55


 


Creatine Kinase  60 


 


CK-MB (CK-2)   1.54


 


Troponin I   < 0.012











Impressions: 


 





Chest X-Ray  18 14:59


IMPRESSION:  Left lower lobe airspace disease worrisome for pneumonia


 








Chest/Abdomen CTA  18 15:46


IMPRESSION:  No CT angio evidence of acute pulmonary emboli.


No CT angio evidence of thoracic or abdominal aortic dissection.  Greater than 

50% narrowing proximal SMA.


 














Assessment & Plan





- Diagnosis


(1) CAP (community acquired pneumonia)


Qualifiers: 


   Laterality: left   Lung location: lower lobe of lung   Qualified Code(s): 

J18.1 - Lobar pneumonia, unspecified organism   


Is this a current diagnosis for this admission?: Yes   


Plan: 


Seen on CTA chest from admission. New O2 requirement, ~2L. Leukocytosis however 

has been taking prednisone for the last 6 days.


- Received Levofloxacin in ED


- Started IV Ceftriaxone and Azithromycin, once improved will transition to PO


- Blood cultures pending


- Duonebs q4 hours WA, mucinex PRN, respiratory PT


- For pleuertic chest pain: ordered Morphine 2mg IV q4 hours PRN severe pain


- Once improved, will consult PT for evaluation











(2) Pleuritic chest pain


Is this a current diagnosis for this admission?: Yes   


Plan: 


Per above. Given severity of pain and difficulty with taking deep inspiration, 

ordered Morphine PRN q4 hours


- Also ordered home Norco 5-325 q6 hours PRN 








(3) OSKAR (acute kidney injury)


Is this a current diagnosis for this admission?: Yes   


Plan: 


LIkely pre-renal in setting of infection


- Reviewed IVF bolus in ED


- Continue IV 1/2 NS at 100 cc/hr


- Re-check BMP in AM








- Time


Time Spent: 50 to 70 Minutes


Critical Time spent with patient: Less than 15 minutes


Anticipated discharge: Home with Homehealth


Within: within 72 hours

## 2018-03-22 LAB
ABSOLUTE LYMPHOCYTES# (MANUAL): 1.3 10^3/UL (ref 0.5–4.7)
ABSOLUTE MONOCYTES # (MANUAL): 1.3 10^3/UL (ref 0.1–1.4)
ABSOLUTE NEUTROPHILS# (MANUAL): 22.7 10^3/UL (ref 1.7–8.2)
ADD MANUAL DIFF: YES
ANION GAP SERPL CALC-SCNC: 9 MMOL/L (ref 5–19)
BASOPHILS NFR BLD MANUAL: 0 % (ref 0–2)
BUN SERPL-MCNC: 22 MG/DL (ref 7–20)
CALCIUM: 8.9 MG/DL (ref 8.4–10.2)
CHLORIDE SERPL-SCNC: 104 MMOL/L (ref 98–107)
CO2 SERPL-SCNC: 26 MMOL/L (ref 22–30)
EOSINOPHIL NFR BLD MANUAL: 2 % (ref 0–6)
ERYTHROCYTE [DISTWIDTH] IN BLOOD BY AUTOMATED COUNT: 13.8 % (ref 11.5–14)
GLUCOSE SERPL-MCNC: 75 MG/DL (ref 75–110)
HCT VFR BLD CALC: 38.7 % (ref 36–47)
HGB BLD-MCNC: 12.4 G/DL (ref 12–15.5)
MCH RBC QN AUTO: 28.5 PG (ref 27–33.4)
MCHC RBC AUTO-ENTMCNC: 32.1 G/DL (ref 32–36)
MCV RBC AUTO: 89 FL (ref 80–97)
MONOCYTES % (MANUAL): 5 % (ref 3–13)
NEUTS BAND NFR BLD MANUAL: 1 % (ref 3–5)
PATH REV BLD -IMP: (no result)
PLATELET # BLD: 270 10^3/UL (ref 150–450)
PLATELET COMMENT: ADEQUATE
POTASSIUM SERPL-SCNC: 4.3 MMOL/L (ref 3.6–5)
RBC # BLD AUTO: 4.36 10^6/UL (ref 3.72–5.28)
SEGMENTED NEUTROPHILS % (MAN): 87 % (ref 42–78)
SODIUM SERPL-SCNC: 139.2 MMOL/L (ref 137–145)
TOTAL CELLS COUNTED BLD: 100
VARIANT LYMPHS NFR BLD MANUAL: 5 % (ref 13–45)
WBC # BLD AUTO: 25.8 10^3/UL (ref 4–10.5)

## 2018-03-22 RX ADMIN — GUAIFENESIN PRN MG: 200 SOLUTION ORAL at 21:12

## 2018-03-22 RX ADMIN — Medication SCH ML: at 05:21

## 2018-03-22 RX ADMIN — CEFTRIAXONE SODIUM SCH MG: 1 INJECTION, POWDER, FOR SOLUTION INTRAMUSCULAR; INTRAVENOUS at 17:14

## 2018-03-22 RX ADMIN — LANSOPRAZOLE SCH MG: 30 TABLET, ORALLY DISINTEGRATING, DELAYED RELEASE ORAL at 16:50

## 2018-03-22 RX ADMIN — SODIUM CHLORIDE PRN ML: 0.45 INJECTION, SOLUTION INTRAVENOUS at 21:12

## 2018-03-22 RX ADMIN — MORPHINE SULFATE PRN MG: 10 INJECTION INTRAMUSCULAR; INTRAVENOUS; SUBCUTANEOUS at 08:06

## 2018-03-22 RX ADMIN — IPRATROPIUM BROMIDE AND ALBUTEROL SULFATE SCH ML: 2.5; .5 SOLUTION RESPIRATORY (INHALATION) at 11:16

## 2018-03-22 RX ADMIN — GUAIFENESIN PRN MG: 200 SOLUTION ORAL at 08:22

## 2018-03-22 RX ADMIN — HYDROCODONE BITARTRATE AND ACETAMINOPHEN PRN TAB: 5; 325 TABLET ORAL at 11:58

## 2018-03-22 RX ADMIN — Medication SCH ML: at 13:50

## 2018-03-22 RX ADMIN — SODIUM CHLORIDE PRN ML: 0.45 INJECTION, SOLUTION INTRAVENOUS at 08:22

## 2018-03-22 RX ADMIN — HYDROCODONE BITARTRATE AND ACETAMINOPHEN PRN TAB: 5; 325 TABLET ORAL at 05:21

## 2018-03-22 RX ADMIN — GUAIFENESIN PRN MG: 200 SOLUTION ORAL at 03:30

## 2018-03-22 RX ADMIN — GUAIFENESIN PRN MG: 200 SOLUTION ORAL at 17:14

## 2018-03-22 RX ADMIN — ENOXAPARIN SODIUM SCH MG: 40 INJECTION SUBCUTANEOUS at 10:55

## 2018-03-22 RX ADMIN — HYDROCODONE BITARTRATE AND ACETAMINOPHEN PRN TAB: 5; 325 TABLET ORAL at 21:12

## 2018-03-22 RX ADMIN — MORPHINE SULFATE PRN MG: 10 INJECTION INTRAMUSCULAR; INTRAVENOUS; SUBCUTANEOUS at 16:59

## 2018-03-22 RX ADMIN — Medication SCH ML: at 21:12

## 2018-03-22 RX ADMIN — LANSOPRAZOLE SCH MG: 30 TABLET, ORALLY DISINTEGRATING, DELAYED RELEASE ORAL at 05:20

## 2018-03-22 RX ADMIN — AZITHROMYCIN MONOHYDRATE SCH MG: 500 INJECTION, POWDER, LYOPHILIZED, FOR SOLUTION INTRAVENOUS at 21:12

## 2018-03-22 RX ADMIN — IPRATROPIUM BROMIDE AND ALBUTEROL SULFATE SCH ML: 2.5; .5 SOLUTION RESPIRATORY (INHALATION) at 20:46

## 2018-03-22 RX ADMIN — IPRATROPIUM BROMIDE AND ALBUTEROL SULFATE SCH ML: 2.5; .5 SOLUTION RESPIRATORY (INHALATION) at 17:09

## 2018-03-22 RX ADMIN — IPRATROPIUM BROMIDE AND ALBUTEROL SULFATE SCH ML: 2.5; .5 SOLUTION RESPIRATORY (INHALATION) at 07:44

## 2018-03-22 RX ADMIN — MORPHINE SULFATE PRN MG: 10 INJECTION INTRAMUSCULAR; INTRAVENOUS; SUBCUTANEOUS at 00:29

## 2018-03-22 NOTE — PDOC PROGRESS REPORT
Subjective


Progress Note for:: 03/22/18


Subjective:: 


Feeling a little better, but had a "rough night". Continues to have pleuretic 

chest pain. Morphine helping. Able to take deeper breaths. Sputum feels looser. 

Denies fevers, chills, abdominal pain, NV. Remains on 2L NC. 


Reason For Visit: 


PNEUMONIA








Physical Exam


Vital Signs: 


 











Temp Pulse Resp BP Pulse Ox


 


 97.7 F   93   18   114/59 L  100 


 


 03/22/18 07:16  03/22/18 07:44  03/22/18 07:44  03/22/18 07:16  03/22/18 07:44








 Intake & Output











 03/21/18 03/22/18 03/23/18





 06:59 06:59 06:59


 


Intake Total  1480 


 


Output Total  600 


 


Balance  880 


 


Weight  71.1 kg 











General appearance: PRESENT: no acute distress, cooperative, well-developed, 

well-nourished


Head exam: PRESENT: normocephalic


Mouth exam: PRESENT: moist


Respiratory exam: PRESENT: rhonchi, unlabored, wheezes.  ABSENT: tachypnea


Cardiovascular exam: PRESENT: +S1, +S2.  ABSENT: systolic murmur, tachycardia


GI/Abdominal exam: PRESENT: soft, tenderness - LLL flank/lower back pain


Neurological exam: PRESENT: alert, awake, CN II-XII grossly intact


Psychiatric exam: PRESENT: normal mood





Results


Laboratory Results: 


 





 03/22/18 05:12 





 03/22/18 05:12 





 











  03/22/18 03/22/18





  05:12 05:12


 


WBC  25.8 H 


 


RBC  4.36 


 


Hgb  12.4 


 


Hct  38.7 


 


MCV  89 


 


MCH  28.5 


 


MCHC  32.1 


 


RDW  13.8 


 


Plt Count  270 


 


Seg Neutrophils %  Not Reportable 


 


Lymphocytes %  Not Reportable 


 


Monocytes %  Not Reportable 


 


Eosinophils %  Not Reportable 


 


Basophils %  Not Reportable 


 


Absolute Neutrophils  Not Reportable 


 


Absolute Lymphocytes  Not Reportable 


 


Absolute Monocytes  Not Reportable 


 


Absolute Eosinophils  Not Reportable 


 


Absolute Basophils  Not Reportable 


 


Sodium   139.2


 


Potassium   4.3


 


Chloride   104


 


Carbon Dioxide   26


 


Anion Gap   9


 


BUN   22 H


 


Creatinine   1.15


 


Est GFR ( Amer)   57 L


 


Est GFR (Non-Af Amer)   47 L


 


Glucose   75


 


Calcium   8.9











Impressions: 


 





Chest X-Ray  03/21/18 14:59


IMPRESSION:  Left lower lobe airspace disease worrisome for pneumonia


 








Chest/Abdomen CTA  03/21/18 15:46


IMPRESSION:  No CT angio evidence of acute pulmonary emboli.


No CT angio evidence of thoracic or abdominal aortic dissection.  Greater than 

50% narrowing proximal SMA.


 














Assessment & Plan





- Diagnosis


(1) CAP (community acquired pneumonia)


Qualifiers: 


   Laterality: left   Lung location: lower lobe of lung   Qualified Code(s): 

J18.1 - Lobar pneumonia, unspecified organism   


Is this a current diagnosis for this admission?: Yes   


Plan: 


Seen on CTA chest from admission. New O2 requirement, ~2L. Leukocytosis however 

has been taking prednisone for the last 6 days.


- Received Levofloxacin in ED, continue IV Ceftriaxone and Azithromycin for an 

additional day, can consider transitioning to PO on 3/23


- Blood cultures pending, results likely on 3/22


- Duonebs q4 hours WA, mucinex PRN, respiratory PT


- Continue Morphine 2mg IV q4 hours PRN severe pleuertic chest pain


- Consult PT for evaluation on 3/23











(2) Pleuritic chest pain


Is this a current diagnosis for this admission?: Yes   


Plan: 


Slightly improved however continues to endorse pain and difficulty with taking 

deep inspiration


- Continue IV Morphine PRN q4 hours and PO [place] 5-325 q6 hours PRN 








(3) OSKAR (acute kidney injury)


Is this a current diagnosis for this admission?: Yes   


Plan: 


Improved on 3/23, pre-renal in setting of infection


- Continue IV 1/2 NS at 100 cc/hr


- Re-check BMP in AM


- PLan to d/c IVF on 3/23








- Time


Time Spent with patient: Less than 15 minutes


Anticipated discharge: Home with Homehealth


Within: within 48 hours

## 2018-03-23 LAB
ABSOLUTE LYMPHOCYTES# (MANUAL): 0.5 10^3/UL (ref 0.5–4.7)
ABSOLUTE MONOCYTES # (MANUAL): 1.3 10^3/UL (ref 0.1–1.4)
ABSOLUTE NEUTROPHILS# (MANUAL): 24.5 10^3/UL (ref 1.7–8.2)
ADD MANUAL DIFF: YES
ANION GAP SERPL CALC-SCNC: 13 MMOL/L (ref 5–19)
ANISOCYTOSIS BLD QL SMEAR: SLIGHT
BASOPHILS NFR BLD MANUAL: 0 % (ref 0–2)
BUN SERPL-MCNC: 13 MG/DL (ref 7–20)
CALCIUM: 9.5 MG/DL (ref 8.4–10.2)
CHLORIDE SERPL-SCNC: 102 MMOL/L (ref 98–107)
CO2 SERPL-SCNC: 22 MMOL/L (ref 22–30)
EOSINOPHIL NFR BLD MANUAL: 2 % (ref 0–6)
ERYTHROCYTE [DISTWIDTH] IN BLOOD BY AUTOMATED COUNT: 13.9 % (ref 11.5–14)
GLUCOSE SERPL-MCNC: 81 MG/DL (ref 75–110)
HCT VFR BLD CALC: 35.5 % (ref 36–47)
HGB BLD-MCNC: 11.7 G/DL (ref 12–15.5)
MCH RBC QN AUTO: 29.2 PG (ref 27–33.4)
MCHC RBC AUTO-ENTMCNC: 32.9 G/DL (ref 32–36)
MCV RBC AUTO: 89 FL (ref 80–97)
MONOCYTES % (MANUAL): 5 % (ref 3–13)
OVALOCYTES BLD QL SMEAR: SLIGHT
PLATELET # BLD: 337 10^3/UL (ref 150–450)
PLATELET COMMENT: ADEQUATE
POIKILOCYTOSIS BLD QL SMEAR: SLIGHT
POTASSIUM SERPL-SCNC: 5.1 MMOL/L (ref 3.6–5)
RBC # BLD AUTO: 4 10^6/UL (ref 3.72–5.28)
SEGMENTED NEUTROPHILS % (MAN): 91 % (ref 42–78)
SODIUM SERPL-SCNC: 137.3 MMOL/L (ref 137–145)
TOTAL CELLS COUNTED BLD: 100
TOXIC GRANULES BLD QL SMEAR: SLIGHT
VARIANT LYMPHS NFR BLD MANUAL: 2 % (ref 13–45)
WBC # BLD AUTO: 26.9 10^3/UL (ref 4–10.5)
WBC TOXIC VACUOLES BLD QL SMEAR: PRESENT

## 2018-03-23 RX ADMIN — LANSOPRAZOLE SCH MG: 30 TABLET, ORALLY DISINTEGRATING, DELAYED RELEASE ORAL at 04:01

## 2018-03-23 RX ADMIN — HYDROCODONE BITARTRATE AND ACETAMINOPHEN PRN TAB: 5; 325 TABLET ORAL at 21:12

## 2018-03-23 RX ADMIN — HYDROCODONE BITARTRATE AND ACETAMINOPHEN PRN TAB: 5; 325 TABLET ORAL at 04:01

## 2018-03-23 RX ADMIN — LANSOPRAZOLE SCH MG: 30 TABLET, ORALLY DISINTEGRATING, DELAYED RELEASE ORAL at 18:02

## 2018-03-23 RX ADMIN — IPRATROPIUM BROMIDE AND ALBUTEROL SULFATE SCH ML: 2.5; .5 SOLUTION RESPIRATORY (INHALATION) at 12:04

## 2018-03-23 RX ADMIN — GUAIFENESIN PRN MG: 200 SOLUTION ORAL at 09:26

## 2018-03-23 RX ADMIN — GUAIFENESIN PRN MG: 200 SOLUTION ORAL at 04:01

## 2018-03-23 RX ADMIN — IPRATROPIUM BROMIDE AND ALBUTEROL SULFATE SCH ML: 2.5; .5 SOLUTION RESPIRATORY (INHALATION) at 07:59

## 2018-03-23 RX ADMIN — ENOXAPARIN SODIUM SCH MG: 40 INJECTION SUBCUTANEOUS at 09:22

## 2018-03-23 RX ADMIN — METHYLPREDNISOLONE SODIUM SUCCINATE SCH MG: 40 INJECTION, POWDER, FOR SOLUTION INTRAMUSCULAR; INTRAVENOUS at 21:12

## 2018-03-23 RX ADMIN — ALPRAZOLAM PRN MG: 0.5 TABLET ORAL at 18:02

## 2018-03-23 RX ADMIN — IPRATROPIUM BROMIDE AND ALBUTEROL SULFATE SCH ML: 2.5; .5 SOLUTION RESPIRATORY (INHALATION) at 20:30

## 2018-03-23 RX ADMIN — IPRATROPIUM BROMIDE AND ALBUTEROL SULFATE SCH ML: 2.5; .5 SOLUTION RESPIRATORY (INHALATION) at 16:48

## 2018-03-23 RX ADMIN — HYDROCODONE BITARTRATE AND ACETAMINOPHEN PRN TAB: 5; 325 TABLET ORAL at 09:25

## 2018-03-23 RX ADMIN — Medication SCH ML: at 13:58

## 2018-03-23 RX ADMIN — ALPRAZOLAM PRN MG: 0.5 TABLET ORAL at 10:51

## 2018-03-23 RX ADMIN — Medication SCH ML: at 21:12

## 2018-03-23 RX ADMIN — CEFTRIAXONE SODIUM SCH MG: 1 INJECTION, POWDER, FOR SOLUTION INTRAMUSCULAR; INTRAVENOUS at 18:02

## 2018-03-23 RX ADMIN — Medication SCH ML: at 05:03

## 2018-03-24 LAB
ABSOLUTE LYMPHOCYTES# (MANUAL): 0.4 10^3/UL (ref 0.5–4.7)
ABSOLUTE MONOCYTES # (MANUAL): 0.7 10^3/UL (ref 0.1–1.4)
ABSOLUTE NEUTROPHILS# (MANUAL): 17.4 10^3/UL (ref 1.7–8.2)
ADD MANUAL DIFF: YES
BASOPHILS NFR BLD MANUAL: 0 % (ref 0–2)
EOSINOPHIL NFR BLD MANUAL: 0 % (ref 0–6)
ERYTHROCYTE [DISTWIDTH] IN BLOOD BY AUTOMATED COUNT: 13.9 % (ref 11.5–14)
HCT VFR BLD CALC: 33.7 % (ref 36–47)
HGB BLD-MCNC: 11 G/DL (ref 12–15.5)
MCH RBC QN AUTO: 28.7 PG (ref 27–33.4)
MCHC RBC AUTO-ENTMCNC: 32.6 G/DL (ref 32–36)
MCV RBC AUTO: 88 FL (ref 80–97)
MONOCYTES % (MANUAL): 4 % (ref 3–13)
PLATELET # BLD: 349 10^3/UL (ref 150–450)
PLATELET COMMENT: ADEQUATE
PLATELET LARGE: PRESENT
RBC # BLD AUTO: 3.84 10^6/UL (ref 3.72–5.28)
RBC MORPH BLD: (no result)
SEGMENTED NEUTROPHILS % (MAN): 94 % (ref 42–78)
TOTAL CELLS COUNTED BLD: 100
VARIANT LYMPHS NFR BLD MANUAL: 2 % (ref 13–45)
WBC # BLD AUTO: 18.5 10^3/UL (ref 4–10.5)
WBC TOXIC VACUOLES BLD QL SMEAR: PRESENT

## 2018-03-24 RX ADMIN — SENNOSIDES, DOCUSATE SODIUM SCH EACH: 50; 8.6 TABLET, FILM COATED ORAL at 14:00

## 2018-03-24 RX ADMIN — ALPRAZOLAM PRN MG: 0.5 TABLET ORAL at 19:51

## 2018-03-24 RX ADMIN — AZITHROMYCIN MONOHYDRATE SCH MG: 500 INJECTION, POWDER, LYOPHILIZED, FOR SOLUTION INTRAVENOUS at 22:53

## 2018-03-24 RX ADMIN — HYDROCODONE BITARTRATE AND ACETAMINOPHEN PRN TAB: 5; 325 TABLET ORAL at 23:02

## 2018-03-24 RX ADMIN — IPRATROPIUM BROMIDE AND ALBUTEROL SULFATE SCH ML: 2.5; .5 SOLUTION RESPIRATORY (INHALATION) at 11:54

## 2018-03-24 RX ADMIN — METHYLPREDNISOLONE SODIUM SUCCINATE SCH MG: 40 INJECTION, POWDER, FOR SOLUTION INTRAMUSCULAR; INTRAVENOUS at 05:55

## 2018-03-24 RX ADMIN — Medication SCH ML: at 13:17

## 2018-03-24 RX ADMIN — ALPRAZOLAM PRN MG: 0.5 TABLET ORAL at 11:06

## 2018-03-24 RX ADMIN — IPRATROPIUM BROMIDE AND ALBUTEROL SULFATE SCH ML: 2.5; .5 SOLUTION RESPIRATORY (INHALATION) at 08:17

## 2018-03-24 RX ADMIN — CEFTRIAXONE SODIUM SCH MG: 1 INJECTION, POWDER, FOR SOLUTION INTRAMUSCULAR; INTRAVENOUS at 17:19

## 2018-03-24 RX ADMIN — LANSOPRAZOLE SCH MG: 30 TABLET, ORALLY DISINTEGRATING, DELAYED RELEASE ORAL at 05:56

## 2018-03-24 RX ADMIN — IPRATROPIUM BROMIDE AND ALBUTEROL SULFATE SCH ML: 2.5; .5 SOLUTION RESPIRATORY (INHALATION) at 15:59

## 2018-03-24 RX ADMIN — METHYLPREDNISOLONE SODIUM SUCCINATE SCH MG: 40 INJECTION, POWDER, FOR SOLUTION INTRAMUSCULAR; INTRAVENOUS at 22:53

## 2018-03-24 RX ADMIN — IPRATROPIUM BROMIDE AND ALBUTEROL SULFATE SCH ML: 2.5; .5 SOLUTION RESPIRATORY (INHALATION) at 20:15

## 2018-03-24 RX ADMIN — Medication SCH ML: at 22:54

## 2018-03-24 RX ADMIN — METHYLPREDNISOLONE SODIUM SUCCINATE SCH MG: 40 INJECTION, POWDER, FOR SOLUTION INTRAMUSCULAR; INTRAVENOUS at 13:17

## 2018-03-24 RX ADMIN — HYDROCODONE BITARTRATE AND ACETAMINOPHEN PRN TAB: 5; 325 TABLET ORAL at 09:03

## 2018-03-24 RX ADMIN — LANSOPRAZOLE SCH MG: 30 TABLET, ORALLY DISINTEGRATING, DELAYED RELEASE ORAL at 17:19

## 2018-03-24 RX ADMIN — Medication SCH ML: at 05:58

## 2018-03-24 RX ADMIN — DOCUSATE SODIUM SCH MG: 100 CAPSULE, LIQUID FILLED ORAL at 14:00

## 2018-03-24 RX ADMIN — ENOXAPARIN SODIUM SCH MG: 40 INJECTION SUBCUTANEOUS at 09:19

## 2018-03-24 NOTE — PDOC PROGRESS REPORT
Subjective


Progress Note for:: 03/24/18


Subjective:: 





PATRICIO NAJERA is a 67 year old female admitted for LLL PNA. 





The patient was seen this morning on rounds, she still complains of wheezing 

but reports she feels somewhat better today. The patient does not appear to be 

in distress, she is resting comfortably in the bed on supplemental oxygen. 


Reason For Visit: 


PNEUMONIA








Physical Exam


Vital Signs: 


 











Temp Pulse Resp BP Pulse Ox


 


 98.0 F   102 H  18   121/75   98 


 


 03/24/18 11:03  03/24/18 11:54  03/24/18 11:54  03/24/18 11:03  03/24/18 11:03








 Intake & Output











 03/23/18 03/24/18 03/25/18





 06:59 06:59 06:59


 


Intake Total 3879 1827 


 


Output Total 1900 1900 


 


Balance 1979 -73 


 


Weight 73 kg  











General appearance: PRESENT: no acute distress


Head exam: PRESENT: atraumatic


Eye exam: PRESENT: conjunctiva pink


Mouth exam: PRESENT: moist


Neck exam: PRESENT: full ROM


Respiratory exam: PRESENT: wheezes - all lung fields, improved from yesterday 

but expiratory wheezing remains


Cardiovascular exam: PRESENT: +S1, +S2


Pulses: PRESENT: normal radial pulses, normal dorsalis pedis pul


Vascular exam: PRESENT: normal capillary refill


GI/Abdominal exam: PRESENT: normal bowel sounds, soft


Rectal exam: PRESENT: deferred


Extremities exam: PRESENT: full ROM


Musculoskeletal exam: PRESENT: ambulatory


Neurological exam: PRESENT: alert, awake, oriented to person, oriented to place

, oriented to time, oriented to situation


Psychiatric exam: PRESENT: appropriate affect


Skin exam: PRESENT: normal color





Results


Laboratory Results: 


 





 03/24/18 05:18 





 03/23/18 04:14 





 











  03/24/18





  05:18


 


WBC  18.5 H


 


RBC  3.84


 


Hgb  11.0 L


 


Hct  33.7 L


 


MCV  88


 


MCH  28.7


 


MCHC  32.6


 


RDW  13.9


 


Plt Count  349


 


Seg Neutrophils %  Not Reportable


 


Lymphocytes %  Not Reportable


 


Monocytes %  Not Reportable


 


Eosinophils %  Not Reportable


 


Basophils %  Not Reportable


 


Absolute Neutrophils  Not Reportable


 


Absolute Lymphocytes  Not Reportable


 


Absolute Monocytes  Not Reportable


 


Absolute Eosinophils  Not Reportable


 


Absolute Basophils  Not Reportable











Impressions: 


 





Chest X-Ray  03/21/18 14:59


IMPRESSION:  Left lower lobe airspace disease worrisome for pneumonia


 








Chest/Abdomen CTA  03/21/18 15:46


IMPRESSION:  No CT angio evidence of acute pulmonary emboli.


No CT angio evidence of thoracic or abdominal aortic dissection.  Greater than 

50% narrowing proximal SMA.


 











Status: Imported from PACS





Assessment & Plan





- Diagnosis


(1) CAP (community acquired pneumonia)


Qualifiers: 


   Laterality: left   Lung location: lower lobe of lung   Qualified Code(s): 

J18.1 - Lobar pneumonia, unspecified organism   


Is this a current diagnosis for this admission?: Yes   


Plan: 


LLL PNA on CT. Requires 2LNC. Improving leukocytosis


Received Levaquin in the emergency department.  Appropriately being treated for 

community-acquired pneumonia with ceftriaxone and azithromycin. No plan to 

change to PO antibiotics given her hx of COPD and her acute respiratory distress

, I believe this patient still requires IV antibiotics. 


Duo nebs every 4 hours. PRN Xopenex every 4 hours.  Mucinex as needed.  Chest 

physiotherapy Q8.


Wheezing heard in all lung fields this morning, improved from yesterday but 

expiratory wheezing persists. Prednisone increased from 60 to 80mg every 6 

hours.








(2) Pleuritic chest pain


Is this a current diagnosis for this admission?: Yes   


Plan: 


Patient continues to endorse pain and difficulty with taking deep inspiration.  

She states that the IV morphine and 2 tabs Norco help with pain, but the Norco 

"takes too long to kick in."


Continue pain regimen as ordered.








(3) OSKAR (acute kidney injury)


Is this a current diagnosis for this admission?: Yes   


Plan: 


Improved.  Likely prerenal in the setting of infection. 


Patient eating and drinking, no longer needs maintenance IVF








- Time


Time Spent with patient: 15-24 minutes


Anticipated discharge: Home


Within: within 48 hours





- Inpatient Certification


Based on my medical assessment, after consideration of the patient's 

comorbidities, presenting symptoms, or acuity I expect that the services needed 

warrant INPATIENT care.: Yes


I certify that my determination is in accordance with my understanding of 

Medicare's requirements for reasonable and necessary INPATIENT services [42 CFR 

412.3e].: Yes


Medical Necessity: Need for IV Antibiotics, Risk of Complication if Not Cared 

For in Hospital





- Plan Summary


Plan Summary: 





Ultimately, the plan is to discharge the patient home

## 2018-03-25 RX ADMIN — LANSOPRAZOLE SCH MG: 30 TABLET, ORALLY DISINTEGRATING, DELAYED RELEASE ORAL at 05:48

## 2018-03-25 RX ADMIN — METHYLPREDNISOLONE SODIUM SUCCINATE SCH MG: 40 INJECTION, POWDER, FOR SOLUTION INTRAMUSCULAR; INTRAVENOUS at 14:38

## 2018-03-25 RX ADMIN — ALPRAZOLAM PRN MG: 0.5 TABLET ORAL at 21:03

## 2018-03-25 RX ADMIN — ENOXAPARIN SODIUM SCH MG: 40 INJECTION SUBCUTANEOUS at 10:47

## 2018-03-25 RX ADMIN — AZITHROMYCIN MONOHYDRATE SCH MG: 500 INJECTION, POWDER, LYOPHILIZED, FOR SOLUTION INTRAVENOUS at 23:35

## 2018-03-25 RX ADMIN — HYDROCODONE BITARTRATE AND ACETAMINOPHEN PRN TAB: 5; 325 TABLET ORAL at 21:04

## 2018-03-25 RX ADMIN — DOCUSATE SODIUM SCH MG: 100 CAPSULE, LIQUID FILLED ORAL at 14:38

## 2018-03-25 RX ADMIN — METHYLPREDNISOLONE SODIUM SUCCINATE SCH MG: 40 INJECTION, POWDER, FOR SOLUTION INTRAMUSCULAR; INTRAVENOUS at 21:04

## 2018-03-25 RX ADMIN — LANSOPRAZOLE SCH MG: 30 TABLET, ORALLY DISINTEGRATING, DELAYED RELEASE ORAL at 18:44

## 2018-03-25 RX ADMIN — Medication SCH ML: at 14:38

## 2018-03-25 RX ADMIN — IPRATROPIUM BROMIDE AND ALBUTEROL SULFATE SCH ML: 2.5; .5 SOLUTION RESPIRATORY (INHALATION) at 12:37

## 2018-03-25 RX ADMIN — CEFTRIAXONE SODIUM SCH MG: 1 INJECTION, POWDER, FOR SOLUTION INTRAMUSCULAR; INTRAVENOUS at 18:45

## 2018-03-25 RX ADMIN — Medication SCH ML: at 21:04

## 2018-03-25 RX ADMIN — SENNOSIDES, DOCUSATE SODIUM SCH EACH: 50; 8.6 TABLET, FILM COATED ORAL at 14:38

## 2018-03-25 RX ADMIN — Medication SCH ML: at 05:48

## 2018-03-25 RX ADMIN — IPRATROPIUM BROMIDE AND ALBUTEROL SULFATE SCH ML: 2.5; .5 SOLUTION RESPIRATORY (INHALATION) at 16:06

## 2018-03-25 RX ADMIN — IPRATROPIUM BROMIDE AND ALBUTEROL SULFATE SCH ML: 2.5; .5 SOLUTION RESPIRATORY (INHALATION) at 08:41

## 2018-03-25 RX ADMIN — IPRATROPIUM BROMIDE AND ALBUTEROL SULFATE SCH ML: 2.5; .5 SOLUTION RESPIRATORY (INHALATION) at 20:28

## 2018-03-25 RX ADMIN — ASPIRIN SCH MG: 81 TABLET, CHEWABLE ORAL at 18:45

## 2018-03-25 RX ADMIN — METHYLPREDNISOLONE SODIUM SUCCINATE SCH MG: 40 INJECTION, POWDER, FOR SOLUTION INTRAMUSCULAR; INTRAVENOUS at 05:48

## 2018-03-25 NOTE — PDOC PROGRESS REPORT
Subjective


Progress Note for:: 03/25/18


Subjective:: 





PATRICIO NAJERA is a 67 year old female admitted for LLL PNA. 





The patient was seen this morning on rounds, she is resting comfortably in the 

bed on supplemental oxygen. The patient's wheezing is greatly improved today. 

She states she feels much better.





The patient states she had a temporary period of altered mental status the 

night before, shortly after receiving her IV morphine. The patient states that 

she could not remember where she was, she states she knew she was in the 

hospital but could not remember if she was in [place] or [place]. This 

morning, the patient is awake and oriented. She has no focal deficits. In the 

hours surrounding the event, her vital signs were stable. This transient AMS 

could be related to her morphine, but will pursue a TIA workup for completion. 





    


Reason For Visit: 


PNEUMONIA








Physical Exam


Vital Signs: 


 











Temp Pulse Resp BP Pulse Ox


 


 97.4 F   106 H  18   114/54 L  98 


 


 03/25/18 12:00  03/25/18 12:37  03/25/18 12:37  03/25/18 11:41  03/25/18 08:03








 Intake & Output











 03/24/18 03/25/18 03/26/18





 06:59 06:59 06:59


 


Intake Total 1827 1512 


 


Output Total 1900 1300 


 


Balance -73 212 


 


Weight  71.8 kg 














Results


Laboratory Results: 


 





 03/24/18 05:18 





 03/23/18 04:14 








Impressions: 


 





Chest X-Ray  03/21/18 14:59


IMPRESSION:  Left lower lobe airspace disease worrisome for pneumonia


 








Chest/Abdomen CTA  03/21/18 15:46


IMPRESSION:  No CT angio evidence of acute pulmonary emboli.


No CT angio evidence of thoracic or abdominal aortic dissection.  Greater than 

50% narrowing proximal SMA.


 








Head CT  03/25/18 00:00


IMPRESSION:  NORMAL BRAIN CT WITHOUT CONTRAST.


EVIDENCE OF ACUTE STROKE: No


 














Assessment & Plan





- Diagnosis


(1) CAP (community acquired pneumonia)


Qualifiers: 


   Laterality: left   Lung location: lower lobe of lung   Qualified Code(s): 

J18.1 - Lobar pneumonia, unspecified organism   


Is this a current diagnosis for this admission?: Yes   


Plan: 


LLL PNA on CT. Requires 2LNC. Improving leukocytosis


Received Levaquin in the emergency department.  Appropriately being treated for 

community-acquired pneumonia with ceftriaxone and azithromycin. No plan to 

change to PO antibiotics given her hx of COPD and her acute respiratory distress

, I believe this patient still requires IV antibiotics. Will complete 

antibiotic regimen tomorrow.


Duo nebs every 4 hours. PRN Xopenex every 4 hours.  Mucinex as needed.  Chest 

physiotherapy Q8.


Wheezing heard in all lung fields this morning, improved from yesterday but 

expiratory wheezing persists. Prednisone 80mg every 6 hours.


Barring any complications the patient will be discharged home tomorrow








(2) Pleuritic chest pain


Is this a current diagnosis for this admission?: Yes   


Plan: 


Patient continues to endorse pain and difficulty with taking deep inspiration. 

Will discontinue IV morphine, continue PO Norco. 











(3) OSKAR (acute kidney injury)


Is this a current diagnosis for this admission?: Yes   


Plan: 


Improved.  Likely prerenal in the setting of infection. 


Patient eating and drinking, no longer needs maintenance IVF








(4) Altered mental status


Qualifiers: 


   Altered mental status type: transient alteration of awareness   Qualified 

Code(s): R40.4 - Transient alteration of awareness   


Is this a current diagnosis for this admission?: Yes   


Plan: 


The patient reports that last night, she forgot where she was. She states that 

she knew that she was in the hospital but could not remember where she was.  

The patient states she thought she was in [place].  According to nursing 

staff, this episode happened shortly after the patient received a dose of IV 

morphine. I have a strong suspicion that this transient AMS was related to her 

IV morphine.


Discontinue IV morphine.


Initiate aspirin therapy


Pursue TIA workup: head CT benign, carotid doppler tomorrow. 


Barring any complications, will plan to discharge the patient tomorrow.








- Time


Time Spent with patient: 15-24 minutes


Anticipated discharge: Home


Within: within 48 hours





- Inpatient Certification


Based on my medical assessment, after consideration of the patient's 

comorbidities, presenting symptoms, or acuity I expect that the services needed 

warrant INPATIENT care.: Yes


I certify that my determination is in accordance with my understanding of 

Medicare's requirements for reasonable and necessary INPATIENT services [42 CFR 

412.3e].: Yes


Medical Necessity: Risk of Complication if Not Cared For in Hospital





- Plan Summary


Plan Summary: 


Plan to discharge home tomorrow.

## 2018-03-25 NOTE — RADIOLOGY REPORT (SQ)
EXAM DESCRIPTION:  CT HEAD WITHOUT



COMPLETED DATE/TIME:  3/25/2018 9:44 am



REASON FOR STUDY:  r/o tumor, mass, lesion



COMPARISON:  None.



TECHNIQUE:  Axial images acquired through the brain without intravenous contrast.  Images reviewed wi
th bone, brain and subdural windows.   Images stored on PACS.

All CT scanners at this facility use dose modulation, iterative reconstruction, and/or weight based d
osing when appropriate to reduce radiation dose to as low as reasonably achievable (ALARA).

[facility]: Dose Right  [facility]: CareDose    [facility]: Dose Right    [facility]: Teradose 4D    [facility]: Smart Sovran Self Storage



RADIATION DOSE:  CT Rad equipment meets quality standard of care and radiation dose reduction techniq
ues were employed. CTDIvol: 64.6 mGy. DLP: 2068 mGy-cm. mGy.



LIMITATIONS:  None.



FINDINGS:  VENTRICLES: Normal size and contour.

CEREBRUM: No masses.  No hemorrhage.  No midline shift.  No evidence for acute infarction. Normal gra
y/white matter differentiation. No areas of low density in the white matter.

CEREBELLUM: No masses.  No hemorrhage.  No alteration of density.  No evidence for acute infarction.

EXTRAAXIAL SPACES: No fluid collections.  No masses.

ORBITS AND GLOBE: No intra- or extraconal masses.  Normal contour of globe without masses.

CALVARIUM: No fracture.

PARANASAL SINUSES: No fluid or mucosal thickening.

SOFT TISSUES: No mass or hematoma.

OTHER: No other significant finding.



IMPRESSION:  NORMAL BRAIN CT WITHOUT CONTRAST.

EVIDENCE OF ACUTE STROKE: No



COMMENT:  Quality ID # 436: Final reports with documentation of one or more dose reduction techniques
 (e.g., Automated exposure control, adjustment of the mA and/or kV according to patient size, use of 
iterative reconstruction technique)



TECHNICAL DOCUMENTATION:  JOB ID:  [id number]

 2011 [employer]- All Rights Reserved



Reading location - IP/workstation name: [email or web address]

## 2018-03-26 LAB
ANION GAP SERPL CALC-SCNC: 7 MMOL/L (ref 5–19)
BUN SERPL-MCNC: 31 MG/DL (ref 7–20)
CALCIUM: 9.6 MG/DL (ref 8.4–10.2)
CHLORIDE SERPL-SCNC: 105 MMOL/L (ref 98–107)
CO2 SERPL-SCNC: 29 MMOL/L (ref 22–30)
ERYTHROCYTE [DISTWIDTH] IN BLOOD BY AUTOMATED COUNT: 13.9 % (ref 11.5–14)
GLUCOSE SERPL-MCNC: 112 MG/DL (ref 75–110)
HCT VFR BLD CALC: 34 % (ref 36–47)
HGB BLD-MCNC: 10.9 G/DL (ref 12–15.5)
MCH RBC QN AUTO: 28.4 PG (ref 27–33.4)
MCHC RBC AUTO-ENTMCNC: 32.2 G/DL (ref 32–36)
MCV RBC AUTO: 88 FL (ref 80–97)
PHOSPHATE SERPL-MCNC: 3.4 MG/DL (ref 2.5–4.5)
PLATELET # BLD: 456 10^3/UL (ref 150–450)
POTASSIUM SERPL-SCNC: 5 MMOL/L (ref 3.6–5)
RBC # BLD AUTO: 3.85 10^6/UL (ref 3.72–5.28)
SODIUM SERPL-SCNC: 140.5 MMOL/L (ref 137–145)
WBC # BLD AUTO: 24.3 10^3/UL (ref 4–10.5)

## 2018-03-26 RX ADMIN — LANSOPRAZOLE SCH MG: 30 TABLET, ORALLY DISINTEGRATING, DELAYED RELEASE ORAL at 17:23

## 2018-03-26 RX ADMIN — Medication SCH ML: at 23:36

## 2018-03-26 RX ADMIN — IPRATROPIUM BROMIDE AND ALBUTEROL SULFATE SCH ML: 2.5; .5 SOLUTION RESPIRATORY (INHALATION) at 16:27

## 2018-03-26 RX ADMIN — ASPIRIN SCH MG: 81 TABLET, CHEWABLE ORAL at 17:23

## 2018-03-26 RX ADMIN — Medication SCH ML: at 14:00

## 2018-03-26 RX ADMIN — HYDROCODONE BITARTRATE AND ACETAMINOPHEN PRN TAB: 5; 325 TABLET ORAL at 09:08

## 2018-03-26 RX ADMIN — ENOXAPARIN SODIUM SCH MG: 40 INJECTION SUBCUTANEOUS at 09:05

## 2018-03-26 RX ADMIN — IPRATROPIUM BROMIDE AND ALBUTEROL SULFATE SCH ML: 2.5; .5 SOLUTION RESPIRATORY (INHALATION) at 08:01

## 2018-03-26 RX ADMIN — METHYLPREDNISOLONE SODIUM SUCCINATE SCH MG: 40 INJECTION, POWDER, FOR SOLUTION INTRAMUSCULAR; INTRAVENOUS at 06:24

## 2018-03-26 RX ADMIN — HYDROCODONE BITARTRATE AND ACETAMINOPHEN PRN TAB: 5; 325 TABLET ORAL at 03:14

## 2018-03-26 RX ADMIN — CEFTRIAXONE SODIUM SCH MG: 1 INJECTION, POWDER, FOR SOLUTION INTRAMUSCULAR; INTRAVENOUS at 17:23

## 2018-03-26 RX ADMIN — IPRATROPIUM BROMIDE AND ALBUTEROL SULFATE SCH ML: 2.5; .5 SOLUTION RESPIRATORY (INHALATION) at 20:15

## 2018-03-26 RX ADMIN — OXYCODONE HYDROCHLORIDE PRN MG: 5 TABLET ORAL at 15:25

## 2018-03-26 RX ADMIN — AZITHROMYCIN MONOHYDRATE SCH MG: 500 INJECTION, POWDER, LYOPHILIZED, FOR SOLUTION INTRAVENOUS at 23:36

## 2018-03-26 RX ADMIN — METHYLPREDNISOLONE SODIUM SUCCINATE SCH MG: 40 INJECTION, POWDER, FOR SOLUTION INTRAMUSCULAR; INTRAVENOUS at 23:35

## 2018-03-26 RX ADMIN — OXYCODONE HYDROCHLORIDE PRN MG: 5 TABLET ORAL at 23:36

## 2018-03-26 RX ADMIN — DOCUSATE SODIUM SCH MG: 100 CAPSULE, LIQUID FILLED ORAL at 14:00

## 2018-03-26 RX ADMIN — METHYLPREDNISOLONE SODIUM SUCCINATE SCH MG: 40 INJECTION, POWDER, FOR SOLUTION INTRAMUSCULAR; INTRAVENOUS at 14:00

## 2018-03-26 RX ADMIN — Medication SCH ML: at 06:25

## 2018-03-26 RX ADMIN — SENNOSIDES, DOCUSATE SODIUM SCH EACH: 50; 8.6 TABLET, FILM COATED ORAL at 14:00

## 2018-03-26 RX ADMIN — LANSOPRAZOLE SCH MG: 30 TABLET, ORALLY DISINTEGRATING, DELAYED RELEASE ORAL at 06:25

## 2018-03-26 RX ADMIN — GUAIFENESIN PRN MG: 200 SOLUTION ORAL at 03:15

## 2018-03-26 RX ADMIN — IPRATROPIUM BROMIDE AND ALBUTEROL SULFATE SCH ML: 2.5; .5 SOLUTION RESPIRATORY (INHALATION) at 11:57

## 2018-03-26 NOTE — RADIOLOGY REPORT (SQ)
EXAM DESCRIPTION:  CAROTID DOPPLER



COMPLETED DATE/TIME:  3/26/2018 11:40 am



REASON FOR STUDY:  TIA



COMPARISON:  None.



TECHNIQUE:  Grayscale ultrasound, Doppler velocity and spectra, and color Doppler images acquired of 
the extra-cranial carotid and vertebral arteries. Images stored on PACS.



LIMITATIONS:  None.



FINDINGS:  RIGHT CAROTID

CCA Velocities: Within normal limits.

ICA Velocities

 Peak systolic 0.64 m/s.

 End diastolic 0.29 m/s.

Proximal ICA/CCA peak systolic ratio 1.3.

Mild calcified plaque proximal ICA.

LEFT CAROTID

CCA Velocities: Within normal limits.

ICA Velocities

 Peak systolic 1.11 m/s.

 End diastolic 0.46 m/s.

Proximal ICA/CCA peak systolic ratio 1.2.

Mild calcified plaque proximal ICA.

VERTEBRAL ARTERIES: Antegrade flow.  Normal waveforms.

SUBCLAVIAN ARTERIES: Not imaged.

OTHER: No other significant finding.



IMPRESSION:  NO HEMODYNAMICALLY SIGNIFICANT STENOSIS.



COMMENT:  Quality ID #195:  Velocity criteria are extrapolated from the diameter data as defined by t
he Society of Radiologists in Ultrasound Consensus Conference. Radiology 2003: 229; 340-346.



TECHNICAL DOCUMENTATION:  JOB ID:  [id number]

 2011 [employer]- All Rights Reserved



Reading location - IP/workstation name: [email or web address]

## 2018-03-26 NOTE — PDOC PROGRESS REPORT
Subjective


Progress Note for:: 03/26/18


Subjective:: 





PATRICIO NAJERA is a 67 year old female admitted for LLL PNA. 





The patient was seen this morning on rounds, she is eating breakfast in her 

bedside recliner on supplemental oxygen. The patient's wheezing is greatly 

improved today, only in LLL. She states she feels much better.











    


Reason For Visit: 


PNEUMONIA








Physical Exam


Vital Signs: 


 











Temp Pulse Resp BP Pulse Ox


 


 97.6 F   99   12   124/77   97 


 


 03/26/18 07:34  03/26/18 08:00  03/26/18 08:00  03/26/18 07:34  03/26/18 08:00








 Intake & Output











 03/25/18 03/26/18 03/27/18





 06:59 06:59 06:59


 


Intake Total 1512 2041 


 


Output Total 1300 1500 


 


Balance 212 541 


 


Weight 71.8 kg 70.1 kg 











General appearance: PRESENT: no acute distress


Head exam: PRESENT: atraumatic


Eye exam: PRESENT: conjunctiva pink


Mouth exam: PRESENT: moist


Neck exam: PRESENT: full ROM


Respiratory exam: PRESENT: symmetrical, unlabored, wheezes - LLL


Cardiovascular exam: PRESENT: +S1, +S2


Pulses: PRESENT: normal radial pulses, normal dorsalis pedis pul


GI/Abdominal exam: PRESENT: normal bowel sounds, soft


Rectal exam: PRESENT: deferred


Extremities exam: PRESENT: full ROM


Musculoskeletal exam: PRESENT: ambulatory, full ROM


Neurological exam: PRESENT: alert, awake, oriented to person, oriented to place

, oriented to time, oriented to situation


Skin exam: PRESENT: normal color





Results


Laboratory Results: 


 





 03/26/18 06:15 





 03/26/18 06:15 





 











  03/26/18 03/26/18





  06:15 06:15


 


WBC  24.3 H 


 


RBC  3.85 


 


Hgb  10.9 L 


 


Hct  34.0 L 


 


MCV  88 


 


MCH  28.4 


 


MCHC  32.2 


 


RDW  13.9 


 


Plt Count  456 H 


 


Sodium   140.5


 


Potassium   5.0


 


Chloride   105


 


Carbon Dioxide   29


 


Anion Gap   7


 


BUN   31 H


 


Creatinine   1.44 H


 


Est GFR ( Amer)   44 L


 


Est GFR (Non-Af Amer)   36 L


 


Glucose   112 H


 


Calcium   9.6


 


Phosphorus   3.4


 


Magnesium   2.4 H











Impressions: 


 





Chest X-Ray  03/21/18 14:59


IMPRESSION:  Left lower lobe airspace disease worrisome for pneumonia


 








Chest/Abdomen CTA  03/21/18 15:46


IMPRESSION:  No CT angio evidence of acute pulmonary emboli.


No CT angio evidence of thoracic or abdominal aortic dissection.  Greater than 

50% narrowing proximal SMA.


 








Head CT  03/25/18 00:00


IMPRESSION:  NORMAL BRAIN CT WITHOUT CONTRAST.


EVIDENCE OF ACUTE STROKE: No


 








Carotid Doppler Study  03/26/18 00:00


IMPRESSION:  NO HEMODYNAMICALLY SIGNIFICANT STENOSIS.


 











Status: Imported from PACS





Assessment & Plan





- Diagnosis


(1) CAP (community acquired pneumonia)


Qualifiers: 


   Laterality: left   Lung location: lower lobe of lung   Qualified Code(s): 

J18.1 - Lobar pneumonia, unspecified organism   


Is this a current diagnosis for this admission?: Yes   


Plan: 


LLL PNA on CT. Requires 2LNC - the patient states she only wears O2 when she 

has a URI or when she experiences a seasonal allergy flare up. 


Worsening leukocytosis today - likely as a result of high dose steroids


Patient remains afebrile. Blood pressure and HR within normal range. 


Received Levaquin in the emergency department.  Appropriately being treated for 

community-acquired pneumonia with ceftriaxone and azithromycin. 5 day 

antibiotic regimen complete today 3/26/2018.


Duo nebs every 4 hours. PRN Xopenex every 4 hours.  Mucinex as needed.  Chest 

physiotherapy Q8.


Wheezing heard in left lower lung fields this morning, improved from yesterday 

but expiratory wheezing persists. Decrease Prednisone from 80mg to 60mg every 6 

hours.











(2) Pleuritic chest pain


Is this a current diagnosis for this admission?: Yes   


Plan: 


Patient continues to endorse pain and difficulty with taking deep inspiration. 

She states that the 2 tablets of Norco are not working for her pain. Plan to 

switch from Norco to oxycodone. 


In light of OSKAR, avoid nephrotoxic medications


 











(3) OSKAR (acute kidney injury)


Is this a current diagnosis for this admission?: Yes   


Plan: 


Worse today.  Likely prerenal in the setting of intravascular volume depletion. 


Continue maintenance IVF








(4) Altered mental status


Qualifiers: 


   Altered mental status type: transient alteration of awareness   Qualified 

Code(s): R40.4 - Transient alteration of awareness   


Is this a current diagnosis for this admission?: Yes   


Plan: 


The patient reports that on the night of 3/24, she forgot where she was. She 

states that she knew that she was in the hospital but could not remember where 

she was.  The patient states she thought she was in [place].  According to 

nursing staff, this episode happened shortly after the patient received a dose 

of IV morphine. I have a strong suspicion that this transient AMS was related 

to her IV morphine.


Discontinue IV morphine.


Continue aspirin therapy


TIA workup complete: head CT benign, carotid doppler normal. 











- Time


Time Spent with patient: 15-24 minutes


Medications reviewed and adjusted accordingly: Yes


Anticipated discharge: Home


Within: within 24 hours





- Inpatient Certification


Based on my medical assessment, after consideration of the patient's 

comorbidities, presenting symptoms, or acuity I expect that the services needed 

warrant INPATIENT care.: Yes


I certify that my determination is in accordance with my understanding of 

Medicare's requirements for reasonable and necessary INPATIENT services [42 CFR 

412.3e].: Yes


Medical Necessity: Risk of Complication if Not Cared For in Hospital





- Plan Summary


Plan Summary: 





The plan is to discharge the patient home. She tells nursing staff that she 

feels unsafe going home today because her  will not be home until very 

late. She has no other family or friends nearby who can look after her. She is 

worried that she will fall at home if she is by herself (the patient has no 

difficulty ambulating). The patient states that her  will be available 

to look after her tomorrow. She feels safer going home tomorrow morning. The 

patient will complete her antibiotic regimen today and, barring any 

complications, get discharged home first thing in the morning.

## 2018-03-27 VITALS — DIASTOLIC BLOOD PRESSURE: 84 MMHG | SYSTOLIC BLOOD PRESSURE: 141 MMHG

## 2018-03-27 LAB
ANION GAP SERPL CALC-SCNC: 9 MMOL/L (ref 5–19)
BUN SERPL-MCNC: 35 MG/DL (ref 7–20)
CALCIUM: 9.5 MG/DL (ref 8.4–10.2)
CHLORIDE SERPL-SCNC: 100 MMOL/L (ref 98–107)
CO2 SERPL-SCNC: 33 MMOL/L (ref 22–30)
ERYTHROCYTE [DISTWIDTH] IN BLOOD BY AUTOMATED COUNT: 14.1 % (ref 11.5–14)
GLUCOSE SERPL-MCNC: 180 MG/DL (ref 75–110)
HCT VFR BLD CALC: 36.6 % (ref 36–47)
HGB BLD-MCNC: 11.9 G/DL (ref 12–15.5)
MCH RBC QN AUTO: 28.7 PG (ref 27–33.4)
MCHC RBC AUTO-ENTMCNC: 32.4 G/DL (ref 32–36)
MCV RBC AUTO: 89 FL (ref 80–97)
PLATELET # BLD: 484 10^3/UL (ref 150–450)
POTASSIUM SERPL-SCNC: 4.8 MMOL/L (ref 3.6–5)
RBC # BLD AUTO: 4.14 10^6/UL (ref 3.72–5.28)
SODIUM SERPL-SCNC: 142.3 MMOL/L (ref 137–145)
WBC # BLD AUTO: 22.3 10^3/UL (ref 4–10.5)

## 2018-03-27 RX ADMIN — NYSTATIN SCH UNIT: 100000 SUSPENSION ORAL at 09:15

## 2018-03-27 RX ADMIN — NYSTATIN SCH UNIT: 100000 SUSPENSION ORAL at 06:11

## 2018-03-27 RX ADMIN — LANSOPRAZOLE SCH MG: 30 TABLET, ORALLY DISINTEGRATING, DELAYED RELEASE ORAL at 06:11

## 2018-03-27 RX ADMIN — ENOXAPARIN SODIUM SCH MG: 40 INJECTION SUBCUTANEOUS at 09:16

## 2018-03-27 RX ADMIN — METHYLPREDNISOLONE SODIUM SUCCINATE SCH MG: 40 INJECTION, POWDER, FOR SOLUTION INTRAMUSCULAR; INTRAVENOUS at 06:11

## 2018-03-27 RX ADMIN — GUAIFENESIN PRN MG: 200 SOLUTION ORAL at 06:11

## 2018-03-27 RX ADMIN — Medication SCH ML: at 06:11

## 2018-03-27 RX ADMIN — IPRATROPIUM BROMIDE AND ALBUTEROL SULFATE SCH ML: 2.5; .5 SOLUTION RESPIRATORY (INHALATION) at 08:15

## 2018-03-27 NOTE — PDOC DISCHARGE SUMMARY
General





- Admit/Disc Date/PCP


Admission Date/Primary Care Provider: 


  03/21/18 19:15





  





Discharge Date: 03/27/18





- Discharge Diagnosis


(1) OSKAR (acute kidney injury)


Is this a current diagnosis for this admission?: Yes   





(2) Altered mental status


Is this a current diagnosis for this admission?: Yes   





(3) CAP (community acquired pneumonia)


Is this a current diagnosis for this admission?: Yes   





(4) Pleuritic chest pain


Is this a current diagnosis for this admission?: Yes   





- Additional Information


Resuscitation Status: Full Code


Discharge Diet: Regular


Discharge Activity: Activity As Tolerated, Balance Activity w/Rest, Slowly 

Increase Activity


Prescriptions: 


Alprazolam [Xanax 0.5 mg Tablet] 0.5 mg PO Q6HP PRN #12 tablet


 PRN Reason: 


Aspirin [Aspirin 81 mg Chewable Tablet] 81 mg PO QPM #90 tab.chew


Azithromycin [Zithromax 250 mg Tablet] 250 mg PO DAILY #3 tablet


Ipratropium/Albuterol Sulfate [Duoneb 3 ml Ampul] 3 ml NEB RTQ8 #90 vial.neb


Lidocaine [Lidoderm 5% (700 mg) Transdermal Patch] 1 patch TP DAILY #30 

adh..patch


Nystatin [Mycostatin 500,000 Unit/5 ml Susp Udcup] 500,000 unit PO 5XD #20 udc


Oxycodone HCl [Oxy-Ir 5 mg Tablet] 5 mg PO Q6HP PRN #12 tablet


 PRN Reason: 


Prednisone [Deltasone 20 mg Tablet] 20 mg PO ASDIR PRN #20 tablet


 PRN Reason: 


Home Medications: 








Albuterol Sulfate [Albuterol Sulfate 2.5mg/3 mL] 1 vial NEB ASDIR PRN 03/21/18 


Albuterol Sulfate [Proair HFA] 2 puff IH Q4HP PRN 03/21/18 


Budesonide/Formoterol Fumarate [Symbicort 160-4.5 Mcg Inhaler] 2 puff IH Q12 03/ 21/18 


Omeprazole Magnesium [Prilosec Otc] 20 mg PO DAILY 03/21/18 


Pilocarpine HCl [Salagen] 5 mg PO QID 03/21/18 


Acetaminophen [Tylenol 325 mg Tablet] 650 mg PO Q4HP PRN  tablet 03/27/18 


Alprazolam [Xanax 0.5 mg Tablet] 0.5 mg PO Q6HP PRN #12 tablet 03/27/18 


Aspirin [Aspirin 81 mg Chewable Tablet] 81 mg PO QPM #90 tab.chew 03/27/18 


Azithromycin [Zithromax 250 mg Tablet] 250 mg PO DAILY #3 tablet 03/27/18 


Ipratropium/Albuterol Sulfate [Duoneb 3 ml Ampul] 3 ml NEB RTQ8 #90 vial.neb 03/ 27/18 


Lidocaine [Lidoderm 5% (700 mg) Transdermal Patch] 1 patch TP DAILY #30 

adh..patch 03/27/18 


Nystatin [Mycostatin 500,000 Unit/5 ml Susp Udcup] 500,000 unit PO 5XD #20 udc 

03/27/18 


Oxycodone HCl [Oxy-Ir 5 mg Tablet] 5 mg PO Q6HP PRN #12 tablet 03/27/18 


Prednisone [Deltasone 20 mg Tablet] 20 mg PO ASDIR PRN #20 tablet 03/27/18 











History of Present Illness


History of Present Illness: 


Per H&P by Dr. Friedman: PATRICIO NAJERA is a 67 year old femalewith history of 

asthma/COPD who presents with 1 day history of acute back pain. Patient states 

that for 6 days she has been taking Prednisone and Azithromycin since the 

pollen has been worse -- this is prescribed by her pulmonologist and she 

usually has to do this every year. This is in the setting of cough with thick 

green sputum production and wheezing. Per pulmonologist also has her use 

supplemental O2 around the clock, however she is unsure how much she is 

actually using. However this AM, she woke up around 3 am with excruciating 

lower left side back pain. She thought she strained a muscle and took 2 tablets 

of her home [place], however this did not provide any relief. Due to persistent 

symptoms, she called EMS. Denies fevers, chills, abdominal pain, NV. She notes 

occasional chest pain with deep breaths. Feels that she is unable to take full 

deep breath due to pain. Denies sick contacts. Due to back pain, CTA chest and 

abdomen were obtained for initial concern for AAA dissection. However, CTA 

chest was notable for dense left lower lobe consolidation above the diaphragm, 

concerning for PNA. Her ED labs were notable for elevated WBC (34.1 with left 

shift) and elevated renal Cr (1.3). Patient was on 2L NC at time of my 

examination. 





Admitted to Hospitalist service for inpatient management of PNA, OSKAR, and 

pleuretic chest pain. 








Hospital Course


Hospital Course: 


The patient was admitted for a Community acquired Left Lower Lobe pneumonia.  

Initial evaluation in the emergency epartment included a CTA of the Chest/Abd/

Pelvis which were negative for evidence of pulmonary emboli.  Incidental 

finding of narrowing of the SMA to <50% was noted. 


She received levaquin x1 in the ED and was continued on Rocephin x 5 days and 

IV Azithromycin.  Final blood cultures were negative and sputum cultures 

revealed normal respiratory and skin bravo.  She received supportive therapy 

including supplemental oxygen, scheduled and as needed nebulizer treatments, 

corticosteroids, and mucinex.  Her pleuritic chest pain was managed with 

tylenol and as needed oxycodone with adequate pain relief.  


On admission day #3, the patient reported an episode of confusion/

disorientation.  According to nursing, this occurred shortly after receiving IV 

morphine for chest wall pain.  A TIA workup was completed; Head CT showed a 

normal brain without contrast and carotid doppler did not demonstrate any 

hemodynamically significant stenosis.  The patient's as needed morphine was 

discontinued and she had no further episodes of confusion.


The patients OSKAR resolved with IV fluid rehydration.


The patient's respiratory status has improved significantly; she is now fully 

conversational and ambulatory on supplemental oxygen at 2 lpm.  She does 

continue to have slight expiratory wheezing and is using oxygen continuously at 

this point.  The patient does have home O2 which she uses at night and during 

acute illnesses.  The patient was advised by the previous provider that she 

would be discharged early today.  However, the patient was noted to still be on 

IV antibiotics and receiving Solu-Medrol at 80 mg q 8 hours.  The ceftriaxone 

was discontinued as she had receivied a 5 day course of therapy, azithromycin 

transitioned to p.o., and solumu-medrol weaned to 40 mg q 8 hours.  The patient 

was asked to stay an additional day for observation and further steroid weaning 

prior to discharge.  Unfortunately, the patient had several family and legal 

obligations that had to be attended to today and she had scheduled them in 

anticipation of her discharge.  The patient reported that is she was not 

discharged, she would have to leave AMA.  As I feel that she is quickly 

approaching a reasonable discharge day and that her medications/prescriptions 

are important to her continued improvement, I did concede to her wishes.  The 

patient was strongly cautioned that early discharge increases her likelihood of 

complications and readmission.  She acknowledged these risks and requests to be 

discharged despite this.


At time of discharge, the patient is in stable condition, maintaining oxygen 

saturations while on 2 lpm (has home O2), and tolerating a regular diet.  She 

is strongly advised to follow up with her primary care provider within 1 week 

or to return to the emergency department for worsening symptoms.








Physical Exam


Vital Signs: 


 











Temp Pulse Resp BP Pulse Ox


 


 97.7 F   109 H  16   141/84 H  94 


 


 03/27/18 12:28  03/27/18 12:28  03/27/18 12:28  03/27/18 12:28  03/27/18 12:28








 Intake & Output











 03/26/18 03/27/18 03/28/18





 06:59 06:59 06:59


 


Intake Total 2041 2152 


 


Output Total 1500 2500 


 


Balance 541 -348 


 


Weight 70.1 kg 70.1 kg 











General appearance: PRESENT: no acute distress, well-developed, well-nourished


Head exam: PRESENT: atraumatic, normocephalic


Eye exam: PRESENT: conjunctiva pink, EOMI, PERRLA.  ABSENT: scleral icterus


Ear exam: PRESENT: normal external ear exam


Mouth exam: PRESENT: moist, tongue midline


Neck exam: ABSENT: carotid bruit, JVD, lymphadenopathy, thyromegaly


Respiratory exam: PRESENT: symmetrical, unlabored, wheezes - occasional slight 

expiratory wheeze, other - Supplemental oxygen at 2 lpm.  ABSENT: rales, rhonchi

, tachypnea


Cardiovascular exam: PRESENT: RRR, +S1, +S2.  ABSENT: diastolic murmur, rubs, 

systolic murmur


Pulses: PRESENT: normal dorsalis pedis pul


Vascular exam: PRESENT: normal capillary refill


GI/Abdominal exam: PRESENT: normal bowel sounds, soft.  ABSENT: distended, 

guarding, mass, organolmegaly, rebound, tenderness


Rectal exam: PRESENT: deferred


Extremities exam: PRESENT: full ROM.  ABSENT: calf tenderness, clubbing, pedal 

edema


Neurological exam: PRESENT: alert, awake, oriented to person, oriented to place

, oriented to time, oriented to situation, CN II-XII grossly intact.  ABSENT: 

motor sensory deficit


Psychiatric exam: PRESENT: agitated, anxious, appropriate affect, normal mood.  

ABSENT: homicidal ideation, suicidal ideation


Skin exam: PRESENT: dry, intact, warm.  ABSENT: cyanosis, rash





Results


Laboratory Results: 


 





 03/27/18 09:29 





 03/27/18 09:29 





 











  03/27/18 03/27/18





  09:29 09:29


 


WBC  22.3 H 


 


RBC  4.14 


 


Hgb  11.9 L 


 


Hct  36.6 


 


MCV  89 


 


MCH  28.7 


 


MCHC  32.4 


 


RDW  14.1 H 


 


Plt Count  484 H 


 


Sodium   142.3


 


Potassium   4.8


 


Chloride   100


 


Carbon Dioxide   33 H


 


Anion Gap   9


 


BUN   35 H


 


Creatinine   1.16


 


Est GFR ( Amer)   56 L


 


Est GFR (Non-Af Amer)   47 L


 


Glucose   180 H


 


Calcium   9.5











Impressions: 


 





Chest X-Ray  03/21/18 14:59


IMPRESSION:  Left lower lobe airspace disease worrisome for pneumonia


 








Chest/Abdomen CTA  03/21/18 15:46


IMPRESSION:  No CT angio evidence of acute pulmonary emboli.


No CT angio evidence of thoracic or abdominal aortic dissection.  Greater than 

50% narrowing proximal SMA.


 








Head CT  03/25/18 00:00


IMPRESSION:  NORMAL BRAIN CT WITHOUT CONTRAST.


EVIDENCE OF ACUTE STROKE: No


 








Carotid Doppler Study  03/26/18 00:00


IMPRESSION:  NO HEMODYNAMICALLY SIGNIFICANT STENOSIS.


 














Qualifiers





- *


**PATEINT BEING DISCHARGED WITH ANY OF THE FOLLOWING DIAGNOSIS?: No

## 2018-05-15 ENCOUNTER — HOSPITAL ENCOUNTER (OUTPATIENT)
Dept: HOSPITAL 62 - END | Age: 68
Discharge: HOME | End: 2018-05-15
Attending: INTERNAL MEDICINE
Payer: MEDICARE

## 2018-05-15 VITALS — SYSTOLIC BLOOD PRESSURE: 118 MMHG | DIASTOLIC BLOOD PRESSURE: 80 MMHG

## 2018-05-15 DIAGNOSIS — J44.9: ICD-10-CM

## 2018-05-15 DIAGNOSIS — Z88.2: ICD-10-CM

## 2018-05-15 DIAGNOSIS — M06.9: ICD-10-CM

## 2018-05-15 DIAGNOSIS — Z79.899: ICD-10-CM

## 2018-05-15 DIAGNOSIS — D86.9: ICD-10-CM

## 2018-05-15 DIAGNOSIS — Z79.891: ICD-10-CM

## 2018-05-15 DIAGNOSIS — K29.50: ICD-10-CM

## 2018-05-15 DIAGNOSIS — K22.2: Primary | ICD-10-CM

## 2018-05-15 DIAGNOSIS — Z79.51: ICD-10-CM

## 2018-05-15 PROCEDURE — 43249 ESOPH EGD DILATION <30 MM: CPT

## 2018-05-15 PROCEDURE — 43239 EGD BIOPSY SINGLE/MULTIPLE: CPT

## 2018-05-15 PROCEDURE — 88342 IMHCHEM/IMCYTCHM 1ST ANTB: CPT

## 2018-05-15 PROCEDURE — 88305 TISSUE EXAM BY PATHOLOGIST: CPT

## 2018-05-15 PROCEDURE — 88341 IMHCHEM/IMCYTCHM EA ADD ANTB: CPT

## 2018-05-15 PROCEDURE — C1726 CATH, BAL DIL, NON-VASCULAR: HCPCS

## 2018-05-15 RX ADMIN — MIDAZOLAM HYDROCHLORIDE ONE MG: 1 INJECTION, SOLUTION INTRAMUSCULAR; INTRAVENOUS at 16:34

## 2018-05-15 RX ADMIN — MIDAZOLAM HYDROCHLORIDE ONE MG: 1 INJECTION, SOLUTION INTRAMUSCULAR; INTRAVENOUS at 16:30

## 2018-05-15 NOTE — OPERATIVE REPORT
Operative Report


DATE OF SURGERY: 05/15/18


Operative Report: 





Pre-op diagnosis: Dysphagia with history of esophageal ring





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





Surgery: Esophagogastroduodenoscopy with biopsy and balloon dilation





Medications: Versed 3mg   Fentanyl  100mcg IV push





Tissue removed: Antral and gastric body biopsy for pathology





Procedure: After informed consent obtained from patient, the throat was sprayed 

with [place] and conscious sedation was achieved.  The upper endoscope was 

inserted into the esophagus under direct vision and advanced into the stomach.  

The duodenum was entered and examined to the second part.  Endoscope was then 

slowly pulled out of the patient as the mucosa was examined into details.  

Patient tolerated procedure well.





Findings





Esophagus: Schatzki's ring was identified at the GE junction with minimum 

stenosis.  The rest of the esophagus was normal.  The ring was then dilated 

with an 18-20 mm balloon.  A small break with bleeding was noted at 20 mm.


Antrum: Normal


Body: Normal


Fundus: Normal


Duodenum first part: Normal


Duodenum second part: Normal





Plan:


Await pathology.  Continue Prilosec


OPERATION: .

## 2018-05-20 ENCOUNTER — HOSPITAL ENCOUNTER (EMERGENCY)
Dept: HOSPITAL 62 - ER | Age: 68
Discharge: HOME | End: 2018-05-20
Payer: MEDICARE

## 2018-05-20 VITALS — DIASTOLIC BLOOD PRESSURE: 81 MMHG | SYSTOLIC BLOOD PRESSURE: 119 MMHG

## 2018-05-20 DIAGNOSIS — L03.114: Primary | ICD-10-CM

## 2018-05-20 DIAGNOSIS — Z87.891: ICD-10-CM

## 2018-05-20 DIAGNOSIS — M79.89: ICD-10-CM

## 2018-05-20 DIAGNOSIS — T78.40XA: ICD-10-CM

## 2018-05-20 DIAGNOSIS — M25.532: ICD-10-CM

## 2018-05-20 DIAGNOSIS — J44.9: ICD-10-CM

## 2018-05-20 LAB
ADD MANUAL DIFF: NO
ANION GAP SERPL CALC-SCNC: 10 MMOL/L (ref 5–19)
BASOPHILS # BLD AUTO: 0.2 10^3/UL (ref 0–0.2)
BASOPHILS NFR BLD AUTO: 1.8 % (ref 0–2)
BUN SERPL-MCNC: 20 MG/DL (ref 7–20)
CALCIUM: 9.8 MG/DL (ref 8.4–10.2)
CHLORIDE SERPL-SCNC: 107 MMOL/L (ref 98–107)
CO2 SERPL-SCNC: 27 MMOL/L (ref 22–30)
CRP SERPL-MCNC: < 5 MG/L (ref ?–10)
EOSINOPHIL # BLD AUTO: 1.1 10^3/UL (ref 0–0.6)
EOSINOPHIL NFR BLD AUTO: 8.8 % (ref 0–6)
ERYTHROCYTE [DISTWIDTH] IN BLOOD BY AUTOMATED COUNT: 15.9 % (ref 11.5–14)
ERYTHROCYTE [SEDIMENTATION RATE] IN BLOOD: 25 MM/HR (ref 0–30)
GLUCOSE SERPL-MCNC: 104 MG/DL (ref 75–110)
HCT VFR BLD CALC: 43.3 % (ref 36–47)
HGB BLD-MCNC: 14.3 G/DL (ref 12–15.5)
LYMPHOCYTES # BLD AUTO: 2.1 10^3/UL (ref 0.5–4.7)
LYMPHOCYTES NFR BLD AUTO: 17.5 % (ref 13–45)
MCH RBC QN AUTO: 28.7 PG (ref 27–33.4)
MCHC RBC AUTO-ENTMCNC: 33 G/DL (ref 32–36)
MCV RBC AUTO: 87 FL (ref 80–97)
MONOCYTES # BLD AUTO: 0.8 10^3/UL (ref 0.1–1.4)
MONOCYTES NFR BLD AUTO: 6.2 % (ref 3–13)
NEUTROPHILS # BLD AUTO: 8 10^3/UL (ref 1.7–8.2)
NEUTS SEG NFR BLD AUTO: 65.7 % (ref 42–78)
PLATELET # BLD: 388 10^3/UL (ref 150–450)
POTASSIUM SERPL-SCNC: 4.5 MMOL/L (ref 3.6–5)
RBC # BLD AUTO: 4.97 10^6/UL (ref 3.72–5.28)
SODIUM SERPL-SCNC: 143.6 MMOL/L (ref 137–145)
TOTAL CELLS COUNTED % (AUTO): 100 %
URATE SERPL-MCNC: 4.8 MG/DL (ref 2.5–7.5)
WBC # BLD AUTO: 12.2 10^3/UL (ref 4–10.5)

## 2018-05-20 PROCEDURE — 99284 EMERGENCY DEPT VISIT MOD MDM: CPT

## 2018-05-20 PROCEDURE — 85652 RBC SED RATE AUTOMATED: CPT

## 2018-05-20 PROCEDURE — 96375 TX/PRO/DX INJ NEW DRUG ADDON: CPT

## 2018-05-20 PROCEDURE — 85025 COMPLETE CBC W/AUTO DIFF WBC: CPT

## 2018-05-20 PROCEDURE — 87040 BLOOD CULTURE FOR BACTERIA: CPT

## 2018-05-20 PROCEDURE — 36415 COLL VENOUS BLD VENIPUNCTURE: CPT

## 2018-05-20 PROCEDURE — 96374 THER/PROPH/DIAG INJ IV PUSH: CPT

## 2018-05-20 PROCEDURE — 86140 C-REACTIVE PROTEIN: CPT

## 2018-05-20 PROCEDURE — L3908 WHO COCK-UP NONMOLDE PRE OTS: HCPCS

## 2018-05-20 PROCEDURE — 80048 BASIC METABOLIC PNL TOTAL CA: CPT

## 2018-05-20 PROCEDURE — 84550 ASSAY OF BLOOD/URIC ACID: CPT

## 2018-05-20 PROCEDURE — 73110 X-RAY EXAM OF WRIST: CPT

## 2018-05-20 NOTE — RADIOLOGY REPORT (SQ)
EXAM DESCRIPTION:  WRIST LEFT 3 VIEWS



COMPLETED DATE/TIME:  5/20/2018 11:20 am



REASON FOR STUDY:  pain



COMPARISON:  None.



NUMBER OF VIEWS:  Three views.



TECHNIQUE:  AP, lateral, and oblique radiographic images acquired of the left wrist.



LIMITATIONS:  None.



FINDINGS:  MINERALIZATION: Normal.

BONES: No acute fracture or dislocation. No worrisome bone lesions. Normal alignment. No significant 
osteophytes.

JOINTS: No erosions.  No latricia-articular osteopenia.  No chondrocalcinosis.

SOFT TISSUES: No swelling.  No calcifications.

OTHER: No other significant finding.



IMPRESSION:  NEGATIVE STUDY OF THE LEFT WRIST. NO EXPLANATION FOR PAIN.



TECHNICAL DOCUMENTATION:  JOB ID:  [id number]

 2011 [employer]- All Rights Reserved



Reading location - IP/workstation name: [email or web address]

## 2018-05-20 NOTE — ER DOCUMENT REPORT
ED Extremity Problem, Upper





- General


Chief Complaint: Wrist Pain


Stated Complaint: WRIST PAIN


Time Seen by Provider: 18 10:59


Mode of Arrival: Ambulatory


Information source: Patient


Notes: 





67-year-old female presented ED for complaint of left wrist pain which started 

last night after she ate beans and brought worse.  She states she has no 

history of gout no history of a fall no history of it injury.  The left wrist 

is red warm and swollen near the joint.  She has good cap refill good range of 

motion.  Patient states she does have a history of arthritis and has a 

appointment with her rheumatologist for tomorrow.  Patient is alert oriented 

pupils equal and react light speaking in full sentences.


TRAVEL OUTSIDE OF THE [place] IN LAST 30 DAYS: No





- HPI


Patient complains to provider of: Left, Wrist


Onset: Yesterday


Recent injury: No


Where: Home


Quality of pain: Burning, Throbbing


Severity of pain: Moderate


Pain Level: 3


Associated symptoms: None


Exacerbated by: Movement, Exertion


Relieved by: Rest, Positioning


Similar symptoms previously: No


Recently seen / treated by doctor: No





- Related Data


Allergies/Adverse Reactions: 


 





Sulfa (Sulfonamide Antibiotics) Allergy (Mild, Verified 18 10:40)


 Generalized Itching


dial soap Allergy (Mild, Uncoded 18 10:40)


 Hives











Past Medical History





- General


Information source: Patient





- Social History


Smoking Status: Former Smoker


Cigarette use (# per day): No


Chew tobacco use (# tins/day): No


Smoking Education Provided: No


Frequency of alcohol use: Occasional


Drug Abuse: None


Lives with: Family


Family History: Reviewed & Not Pertinent


Patient has suicidal ideation: No


Patient has homicidal ideation: No





- Past Medical History


Cardiac Medical History: Reports: None


Pulmonary Medical History: Reports: Hx Asthma - medicated, Hx Bronchitis, Hx 

COPD, Hx Pneumonia - 3/18, Other - Sarcoidosis


EENT Medical History: Reports: None


Neurological Medical History: Reports: None


Endocrine Medical History: Reports: None


Renal/ Medical History: Reports: None


Malignancy Medical History: Reports: None


GI Medical History: Reports: None


Musculoskeltal Medical History: Reports Hx Arthritis - RA


Skin Medical History: Reports Hx Eczema


Psychiatric Medical History: Reports: None


Traumatic Medical History: Reports: None


Infectious Medical History: Reports: None


Past Surgical History: Reports: None, Hx  Section - x1, Hx Hysterectomy

, Hx Orthopedic Surgery - 5th metatarsal removed.  Denies: Hx Mastectomy, Hx 

Open Heart Surgery, Hx Pacemaker





- Immunizations


Hx Pneumococcal Vaccination: 05/10/12





Review of Systems





- Review of Systems


Constitutional: No symptoms reported


EENT: No symptoms reported


Cardiovascular: No symptoms reported


Respiratory: No symptoms reported


Gastrointestinal: No symptoms reported


Genitourinary: No symptoms reported


Female Genitourinary: No symptoms reported


Musculoskeletal: Other - Redness swelling and pain to the left wrist


Skin: Other - Redness swelling warmth to the wrist


Hematologic/Lymphatic: No symptoms reported


Neurological/Psychological: No symptoms reported


-: Yes All other systems reviewed and negative





Physical Exam





- Vital signs


Vitals: 


 











Temp Pulse Resp BP Pulse Ox


 


 97.5 F   95   18   128/79 H  96 


 


 18 10:45  18 10:45  18 10:45  18 10:45  18 10:45











Interpretation: Normal





- General


General appearance: Appears well, Alert





- HEENT


Head: Normocephalic, Atraumatic


Eyes: Normal


Pupils: PERRL





- Respiratory


Respiratory status: No respiratory distress


Chest status: Nontender


Breath sounds: Normal


Chest palpation: Normal





- Cardiovascular


Rhythm: Regular


Heart sounds: Normal auscultation


Murmur: No





- Abdominal


Inspection: Normal


Distension: No distension


Bowel sounds: Normal


Tenderness: Nontender


Organomegaly: No organomegaly





- Back


Back: Normal, Nontender





- Extremities


General upper extremity: Normal temperature


General lower extremity: Normal inspection, Nontender, Normal color, Normal ROM

, Normal temperature, Normal weight bearing.  No: Sherry's sign


Wrist: Tender, Limited ROM, Other - Due to pain erythema


Hand: Tender





- Neurological


Neuro grossly intact: Yes


Cognition: Normal


Orientation: AAOx4


[place] Coma Scale Eye Opening: Spontaneous


[place] Coma Scale Verbal: Oriented


[place] Coma Scale Motor: Obeys Commands


[place] Coma Scale Total: 15


Speech: Normal


Motor strength normal: LUE, RUE, LLE, RLE


Sensory: Normal





- Psychological


Associated symptoms: Normal affect, Normal mood





- Skin


Skin Temperature: Warm


Skin Moisture: Dry


Skin Color: Normal





Course





- Re-evaluation


Re-evalutation: 





18 21:51


Labs discussed with patient before discharge and written report of labs and x-

ray given to patient to follow-up with her rheumatologist.  Patient was treated 

with steroids anti-inflammatories and Keflex for the redness swelling and pain 

to her left wrist.  She was also treated with a cock-up splint which she stated 

helped with the pain considerably.  Patient was instructed to please take all 

of her lab values and x-ray with her to her rheumatology appointment.  Patient 

verbalized understanding of his instructions and agreement with treatment plan.





- Vital Signs


Vital signs: 


 











Temp Pulse Resp BP Pulse Ox


 


 97.5 F   80   16   119/81   97 


 


 18 10:45  18 15:21  18 15:21  18 15:21  18 15:21














- Laboratory


Result Diagrams: 


 18 13:19





 18 14:25


Laboratory results interpreted by me: 


 











  18





  13:19 14:25


 


WBC  12.2 H 


 


RDW  15.9 H 


 


Eosinophils %  8.8 H 


 


Absolute Eosinophils  1.1 H 


 


Est GFR (Non-Af Amer)   57 L














- Diagnostic Test


Radiology reviewed: Image reviewed, Reports reviewed





Discharge





- Discharge


Clinical Impression: 


 cellulitis left wrist





Allergic


Qualifiers:


 Encounter type: initial encounter Qualified Code(s): T78.40XA - Allergy, 

unspecified, initial encounter





Condition: Stable


Disposition: HOME, SELF-CARE


Additional Instructions: 


CELLULITIS:





     You have an infection of your skin and underlying soft tissues called 

cellulitis.  This is due to bacteria, which can enter through any break in the 

skin, or even through an irritated hair follicle.  Untreated, cellulitis will 

usually worsen.


     Antibiotics are required.  Usually, warm packs or warm soaks, and 

elevation of the infected area are recommended.  You should start getting 

better within 24 to 36 hours.


     Most infections respond quickly to the right medication. Follow-up care is 

important, however, to check for abscess (boil) formation, unsuspected foreign 

body, or resistant infection.


     If you develop fever, chills, or if the area of infection is becoming 

rapidly more swollen or painful, call the doctor at once.





ACUTE ALLERGIC REACTION:





     Your symptoms are due to an allergic reaction.  Allergy can cause hives, 

swelling of the hands, feet, and face, hoarseness, and difficulty swallowing or 

breathing.  It may be due to exposure to medication, animal dander, foods, 

infection, or insect bites. Medication is a common cause, even when prior use 

of this same medication caused no problems.


     Acute treatment may include adrenalin and antihistamines. Usually, the 

specific allergic agent can't be identified unless repeated episodes occur.


     Home treatment includes the following: 


(1) Stop any suspicious medications.  This will be discussed with you.  


(2) Oral antihistamines for the next four to five days. Example, 

diphenhydramine (Benadryl) every four hours.


(3) You may also use cimetidine (Tagamet), ranitidine (Zantac), or famotidine (

Pepcid) every four hours if diphenhydramine is not controlling itching and 

hives. 


(4) Avoid aspirin until the hives completely disappear.  


(5) Avoid hot baths or showers until the hives are completely gone.


     Call the doctor if faintness, difficulty swallowing, tightness in the chest

, or wheezing occurs.





STEROID MEDICATION INJECTION:


     You have been given an injection of medicine of the cortisone/steroid 

class.  This medication is used to control inflammation or allergy.  It is 

often continued as a pill for a short period of time, until the acute process 

subsides.


     There are usually no side effects from short-term use of cortisone-like 

medications.  Some persons feel an increased sense of well-being and are not 

sleepy at bedtime.  Long-term use of cortisone medications is best avoided, 

unless required for a severe condition.  If your condition does not remit, or 

relapses after the course of corticosteroid medication, you should consult your 

physician.








STEROID MEDICATION:


     You have been given a medicine of the cortisone/steroid class.  This 

medication is used to control inflammation or allergy.  It is usually only 

given for a short period of time, until the acute process subsides.


     There are usually no side effects from short-term use of cortisone-like 

medications.  Some persons feel an increased sense of well-being and are not 

sleepy at bedtime.  Long-term use of cortisone medications is best avoided, 

unless required for a severe condition.  If your condition does not remit, or 

relapses after the course of corticosteroid medication, you should consult your 

physician.








USE OF DIPHENHYDRAMINE:


     The use of diphenhydramine (Benadryl) has been recommended to control 

allergic symptoms.  The 25 mg strength is available over- the-counter, as well 

as the elixir.  This antihistamine is used for many symptoms.  It's useful for 

itching, watering eyes and nose, allergic swelling, hives, and insect stings.  

The medication can be repeated four times daily.


     Age         Elixir (12.5 mg/tsp)         25 mg pill


     2-3 yr      1/2 tsp


     4-8 yr      1 tsp


     9-14 yr     2 tsp                        one tab


     adult                                    1-2 tabs


     Antihistamines may cause drowsiness, especially with the first dose.  Do 

not operate machinery or drive while under the effects of the medication.  Do 

not combine the medication with alcohol, or with any other medication without 

talking to your doctor.





ORAL NARCOTIC MEDICATION:


     You have been given a percocet for pain control.  This medication is a 

narcotic.  It's best taken with food, as nausea can result if taken on an empty 

stomach.


     Don't operate machinery or drive within six hours of taking this 

medication.  Do not combine this medicine with alcohol, or with any medication 

which can cause sedation (such as cold tablets or sleeping pills) unless you 

get permission from the physician.


     Narcotics tend to cause constipation.  If possible, drink plenty of fluids 

and eat a diet high in fiber and fruits.





     Please be aware that prescription narcotics also have the potential for 

abuse.  People become addicted to these medications because of the general 

sense of wellbeing that they induce.  This feeling along with a significant 

reduction in tension, anxiety, and aggression provides a stimulating seductive 

quality to these drugs.  Once your pain is under control, we encourage you to 

discard your unused narcotics.





Toradol Injection





     You have been given an injection of ketorolac tromethamine (Toradol).  

This is an excellent, safe drug for pain control.  It also has potent 

antiinflammatory action.  You should have significant pain relief within about 

one hour.


     Toradol is not addicting and is non-sedating.  It does not interfere with 

driving or work.


     Call or return if you develop itching, hives, shortness of breath, or rash.





FOLLOW-UP CARE:


If you have been referred to a physician for follow-up care, call the physician

s office for an appointment as you were instructed or within the next two days.

  If you experience worsening or a significant change in your symptoms, notify 

the physician immediately or return to the Emergency Department at any time for 

re-evaluation.





Please follow-up with your rheumatologist as scheduled.


Prescriptions: 


Cephalexin Monohydrate [Keflex 500 mg Capsule] 500 mg PO Q6H 5 Days  capsule


Prednisone [Deltasone 20 mg Tablet] 3 tab PO DAILY 5 Days  tablet


Forms:  Elevated Blood Pressure


Referrals: 


MARY ANN CASTANO PA [Primary Care Provider] - Follow up as needed

## 2018-06-04 ENCOUNTER — HOSPITAL ENCOUNTER (OUTPATIENT)
Dept: HOSPITAL 62 - RAD | Age: 68
End: 2018-06-04
Attending: INTERNAL MEDICINE
Payer: MEDICARE

## 2018-06-04 DIAGNOSIS — R13.10: ICD-10-CM

## 2018-06-04 DIAGNOSIS — K22.8: Primary | ICD-10-CM

## 2018-06-04 DIAGNOSIS — F45.8: ICD-10-CM

## 2018-06-04 PROCEDURE — 82565 ASSAY OF CREATININE: CPT

## 2018-06-04 PROCEDURE — 70491 CT SOFT TISSUE NECK W/DYE: CPT

## 2018-06-04 NOTE — RADIOLOGY REPORT (SQ)
EXAM DESCRIPTION:  CT SOFT TISSUE NECK WITH



COMPLETED DATE/TIME:  6/4/2018 2:04 pm



REASON FOR STUDY:  DYSPHAGIA, UNSPECIFIED R13.10  DYSPHAGIA, UNSPECIFIED F45.8  OTHER SOMATOFORM DISO
RDERS K22.8  OTHER SPECIFIED DISEASES OF ESOPHAGUS



COMPARISON:  None.



TECHNIQUE:  Post IV contrasted scanning from skull base through lung apices with review of bone, soft
 tissue and lung windows.  Reconstructed coronal and sagittal MPR images reviewed.  All images stored
 on PACS.

All CT scanners at this facility use dose modulation, iterative reconstruction, and/or weight based d
osing when appropriate to reduce radiation dose to as low as reasonably achievable (ALARA).

[facility]: Dose Right  [facility]: CareDose    [facility]: Dose Right    [facility]: Teradose 4D    [facility]: Smart Technologies



CONTRAST TYPE AND DOSE:  contrast/concentration: Isovue 370.00 mg/ml; Total Contrast Delivered: 75.0 
ml; Total Saline Delivered: 55.0 ml



RENAL FUNCTION:  Creatinine 1.3.



RADIATION DOSE:   .



LIMITATIONS:  None.



FINDINGS:  SKULL BASE: Intact.

MAJOR SALIVARY GLANDS: No solid or cystic masses.  No inflammatory changes.

LYMPHADENOPATHY: No adenopathy.

MUCOSAL MASSES OR ASYMMETRY: No mucosal masses or asymmetry.

LARYNX/CORDS: No abnormal findings.

VASCULAR STRUCTURES: The major vessels are patent.  Incidental finding of both right and left interna
l carotid arteries curving medially and have a nearly central retropharyngeal location at the level o
f the base of the tongue.

LUNG APICES: Clear.

BONES: Intact.

THYROID: Normal size.  No masses.

PARANASAL SINUSES: Clear.

OTHER: No other significant finding.



IMPRESSION:  NO SIGNIFICANT FINDING IN THE SOFT TISSUES OF THE NECK.



TECHNICAL DOCUMENTATION:  JOB ID:  [id number]

Quality ID # [id number]: Final reports with documentation of one or more dose reduction techniques (e.g., Au
tomated exposure control, adjustment of the mA and/or kV according to patient size, use of iterative 
reconstruction technique)

 2011 [employer]- All Rights Reserved



Reading location - IP/workstation name: [email or web address]

## 2018-07-10 ENCOUNTER — HOSPITAL ENCOUNTER (OUTPATIENT)
Dept: HOSPITAL 62 - RAD | Age: 68
End: 2018-07-10
Attending: INTERNAL MEDICINE
Payer: MEDICARE

## 2018-07-10 DIAGNOSIS — R13.10: ICD-10-CM

## 2018-07-10 DIAGNOSIS — F45.8: Primary | ICD-10-CM

## 2018-07-10 DIAGNOSIS — K22.8: ICD-10-CM

## 2018-07-10 PROCEDURE — 92611 MOTION FLUOROSCOPY/SWALLOW: CPT

## 2018-07-10 PROCEDURE — 74230 X-RAY XM SWLNG FUNCJ C+: CPT

## 2018-07-10 NOTE — RADIOLOGY REPORT (SQ)
EXAM DESCRIPTION:  COOKIE SWALLOW



COMPLETED DATE/TIME:  7/10/2018 9:09 am



REASON FOR STUDY:  GLOBUS (F45.8), DYSPHAGIA (R13.10), ODYNOPHAGIA (K22.8) F45.8  OTHER SOMATOFORM DI
SORDERS R13.10  DYSPHAGIA, UNSPECIFIED K22.8  OTHER SPECIFIED DISEASES OF ESOPHAGUS



COMPARISON:  None.



TECHNIQUE:  Videofluoroscopic swallowing examination was performed in conjunction with speech patholo
gy.  Videofluoroscopic imaging was obtained and reviewed and these are the findings:



RADIATION DOSE:  Fluoro time 1.53 minutes

1 images saved to PACS.



LIMITATIONS:  None



FINDINGS:  The patient was brought into the fluoro room and placed upright on a modified barium swall
ow chair.  The patient was then given multiple consistencies mixed with barium to swallow under live 
fluoroscopic video guidance.  According to the Speech Pathologist there was no penetration or aspirat
ion. Please refer to the speech pathology report for further details.



IMPRESSION:  NO EVIDENCE OF PENETRATION OR ASPIRATION.PLEASE SEE SPEECH PATHOLOGIST REPORT FOR OTHER 
FINDINGS AND RECOMMENDATIONS.



COMMENT:  None

Quality :  Final reports for procedures using fluoroscopy that document radiation exposure asiya
patrick, or exposure time and number of fluorographic images (if radiation exposure indices are not avail
able)



TECHNICAL DOCUMENTATION:  JOB ID: [id number]

 2011 [employer]- All Rights Reserved



Reading location - IP/workstation name: [email or web address] [id number]

## 2018-07-10 NOTE — ST MODIFIED BARIUM SWALLOW
Recommendation





- Recommendations


Recommendations: Functional swallow skills seen for all trial textures. 

Recommend patient follow up with ENT due to tenderness/pain on right side of 

anterior neck superficially.





Medical Diagnoses





- Medical Diagnoses


Medical Diagnosis Description & ICD-10 Code(s): dysphagia R13.10, globus F45.8, 

odynophagia K22.8


Other Medical Diagnoses/Co-Morbidities: per patient report: COPD, reflux, 

sarcoidosis, back pain





ST Modified Barium Swallow





- General


Date: 07/10/18


Referring Physician: Dr. Her


Risks/Precautions: None


Date of Onset: 07/01/14 - approximate date per patient report


Reason for Referral: discomfort swallowing, globus





- History


History obtained from: Patient


-: Medical - Patient reports difficulty swallowing "4-5 years ago" with no 

clear etiology of onset. The patient does report having multiple esophageal 

dilations, the most recent being on 5/15/18.  She also reports diagnosis of 

reflux for which she takes omeprazole. She does report having pneumonia in 

April of this year, but no recurrent pneumonia reported. She also reports pain/

tenderness to touch on the right side of anterior neck, near level of larynx.


Medications: per patient report: omeprazole, mucinex, cymbacort, pro air inhaler

, zyrtec, pain medication.


Allergies: per patient report: sulfa drugs, dial soap





- Functional Status


Prior Functional Status: INDEPENDENT: feeding - globus sensation for 4-5 years


Current Functional Limitations: feeding





- Subjective


Patient/caregiver goal(s): r/o struct. abnormality


Cognitive-Linguistic Function: WNL


Speech Intelligibility: WNL


Current Nutritional Means: PO


Current PO diet: Regular


Current symptoms: Coughing, c/o Globus sensation


Pain: Patient reports, 3/5 - back and leg pain





- Objective


Assessment: Upright, Left Lateral





- Food Trials Used


Food trials used: Thin liquids, Pureed, Regular


The patient: Was Able to Self Feed, via cup, via spoon





- Oral-Motor Skills


Velo-pharyngeal function: Unremarkable





- Assessment


Oral prep: Normal


Labial closure: Adequate


Leakage: None


Mastication: Adequate


Lingual Movement: Normal


Oral stage: Normal for this Procedure





- Pharyngeal Stage


Initiation of Pharyngeal Stage Reflex: Normal


Decreased laryngeal elevation: No


Reduced Velopharyngeal Closure: no


Reduced pressure generation: No


reduced tongue-based retraction: No


Pre-swallow pooling in valleculae: None


Pre-Swallow pooling in pyriforms: None


Reduced Thyro-Hyoid approximation: No


Reduced epiglottic excursion: No


Reduced pharyngeal peristalsis/contraction: No


Post-swallow residulas vallecular: None


Post-Swallow residuals in pyriforms: Mild


Reduced Cricopharyngeal opening: No


Pharyngeal Stage Comments: 





possible signs of soft tissue variance at level of laryngeal vestibule.





- Fall Risk Assessment


Medications/Conditions that increase fall risks include: Antidepressants, 

sedatives, anti-arrhythmic, diuretic, benzodiazipenes, neuroleptics.  BP 

regulation problems, cardiac problems, balance or gait deficits, neurological 

problems.


Is patient considered at risk for falls: no


Fall Risk Actions Taken: No action needed





- Behavioral Observations


During evaluation process patient: was cooperative, able to answer questions, 

provided medical history





- Treatment / Educational Needs:


Treatment/Education Needs: Treatment consisted of patient education on the role 

of the Speech Pathologist.  Patient's plan of care and golas were communicated 

as well as scheduling and attendance policies.  Recommendations for initial 

home program were shared.  Patient demonstrated understanding and verbalized 

agreement.





- Impression/Summary


Laryngeal Penetration: No


Tracheal Aspiration: no


Patient presents with: Normal swallow at eval


Risk of Aspiration: Minimal


Risk of nutritional compromise: None


Evaluation and Findings: Patient presents with normal oral and pharyngeal 

swallowing skills at this study. No diet change recommendations. Patient may 

want to follow up with ENT due to reports of pain/tenderness on right side of 

anterior neck near level of larynx. Signs of possible soft tissue variance seen 

in pharynx, not impacting swallowing.





- Recommendations


Solid diet recommendations: Regular


Liquid Diet Modification: Thin


Pt/Family education and followup with MD: Yes


Dysphagia therapy with SLP: no


Reflux Precautions: Taught to Patient


Recommended techniques: Fully Upright During Meal, Small Bites and Sips, 

Alternate Bites/Sips


Information, Precautions and Recommendations: Patient (Written), Patient (Verbal

)





- Time


Total Time: 30





- Plan of Care


Strategies to optimize patient understanding include:: ongoing assessment of 

educational needs, implementation of educational strategies, and re-education.





- -


-: Thank you for the opportunity to work with this patient and his/her family.  

Should you have any questions about this patient's plan or progress, I can be 

reached at [phone].





Charge G Code?





- -


-: Yes





ST F.L. Impairment Category





- Rationale Based On


Rationale Based On: Func. Asses. Tool Results





- Swallowing


Current : CH 0% Impaired


Goal : CH 0% Impaired


Discharge : CH 0% Impaired

## 2018-10-09 ENCOUNTER — HOSPITAL ENCOUNTER (OUTPATIENT)
Dept: HOSPITAL 62 - OD | Age: 68
End: 2018-10-09
Attending: OTOLARYNGOLOGY
Payer: MEDICARE

## 2018-10-09 DIAGNOSIS — Z86.2: ICD-10-CM

## 2018-10-09 DIAGNOSIS — J30.9: Primary | ICD-10-CM

## 2018-10-09 PROCEDURE — 86003 ALLG SPEC IGE CRUDE XTRC EA: CPT

## 2018-10-09 PROCEDURE — 82785 ASSAY OF IGE: CPT

## 2018-10-09 PROCEDURE — 36415 COLL VENOUS BLD VENIPUNCTURE: CPT

## 2018-10-09 PROCEDURE — 86235 NUCLEAR ANTIGEN ANTIBODY: CPT

## 2018-10-11 ENCOUNTER — HOSPITAL ENCOUNTER (OUTPATIENT)
Dept: HOSPITAL 62 - RAD | Age: 68
End: 2018-10-11
Attending: OTOLARYNGOLOGY
Payer: MEDICARE

## 2018-10-11 DIAGNOSIS — Z86.2: ICD-10-CM

## 2018-10-11 DIAGNOSIS — J30.9: Primary | ICD-10-CM

## 2018-10-11 PROCEDURE — 70486 CT MAXILLOFACIAL W/O DYE: CPT

## 2019-03-19 ENCOUNTER — HOSPITAL ENCOUNTER (OUTPATIENT)
Dept: HOSPITAL 62 - RAD | Age: 69
End: 2019-03-19
Attending: INTERNAL MEDICINE
Payer: MEDICARE

## 2019-03-19 DIAGNOSIS — J45.909: ICD-10-CM

## 2019-03-19 DIAGNOSIS — G47.30: ICD-10-CM

## 2019-03-19 DIAGNOSIS — R06.09: Primary | ICD-10-CM

## 2019-03-19 DIAGNOSIS — J40: ICD-10-CM

## 2019-03-19 DIAGNOSIS — J44.1: ICD-10-CM

## 2019-03-19 DIAGNOSIS — J43.9: ICD-10-CM

## 2019-03-19 DIAGNOSIS — K21.9: ICD-10-CM

## 2019-03-19 DIAGNOSIS — R09.02: ICD-10-CM

## 2019-03-19 DIAGNOSIS — J45.901: ICD-10-CM

## 2019-03-19 DIAGNOSIS — Z87.891: ICD-10-CM

## 2019-03-19 DIAGNOSIS — J44.9: ICD-10-CM

## 2019-03-19 DIAGNOSIS — R05: ICD-10-CM

## 2019-03-19 PROCEDURE — 71250 CT THORAX DX C-: CPT

## 2019-03-19 NOTE — RADIOLOGY REPORT (SQ)
EXAM DESCRIPTION:  CT CHEST WITHOUT



COMPLETED DATE/TIME:  3/19/2019 10:59 am



REASON FOR STUDY:  EXERTIONAL DYSPNEA R06.09  OTHER FORMS OF DYSPNEA J44.1  CHRONIC OBSTRUCTIVE PULMO
NARY DISEASE W (ACUTE) EXACER



COMPARISON:  3/21/2018



TECHNIQUE:  CT scan performed of the chest without intravenous contrast.  Images reviewed with lung, 
soft tissue and bone windows.  Reconstructed coronal and sagittal MPR images reviewed.  All images st
ored on PACS.

All CT scanners at this facility use dose modulation, iterative reconstruction, and/or weight based d
osing when appropriate to reduce radiation dose to as low as reasonably achievable (ALARA).

[facility]: Dose Right  [facility]: CareDose    [facility]: Dose Right    [facility]: Teradose 4D    [facility]: Smart Technologies



RADIATION DOSE:  CT Rad equipment meets quality standard of care and radiation dose reduction techniq
ues were employed. CTDIvol: 6.1 mGy. DLP: 224 mGy-cm



LIMITATIONS:  No technical limitations.



FINDINGS:  LUNGS AND PLEURA:  As on the previous examination, hyperinflated and hyperlucent appearanc
e to the lungs, findings suggest COPD.  Small focal area of scarring or atelectasis in the left lingu
la.  Stable slight areas of scarring or fibrosis in the periphery of the lungs.  No acute pulmonary c
onsolidation.  Stable mild chronic thickening of the bronchial walls.  No pneumothorax or pleural eff
usion.  The central airways are clear.

HILAR AND MEDIASTINAL STRUCTURES: No significant interval changes.

HEART AND VASCULAR STRUCTURES:  Mild atherosclerotic changes involving the thoracic aorta.  Small foc
al areas of coronary artery calcifications.  No aneurysm.  No pericardial effusion.

UPPER ABDOMEN: No significant interval changes. Limited exam.

THYROID AND OTHER SOFT TISSUES:  The visualized thyroid gland is stable in appearance.

BONES:  Slight to mild compression deformity involving the inferior endplate of one of the vertebra a
t the thoracolumbar junction since the previous examination.  Although this finding is new since the 
prior study, it may be on a chronic basis.

HARDWARE: None in the chest.

OTHER: No other significant findings.



IMPRESSION:  1.  As on the prior examination dated 3/21/2018, findings of COPD.

2.  Slight to mild compression deformity involving the inferior endplate of one of the vertebra at th
e thoracolumbar junction since the prior study.  Although this finding is new since the prior study, 
it may be on a chronic basis.

3. Additional stable findings as above.



TECHNICAL DOCUMENTATION:  JOB ID:  [id number]

Quality ID # [id number]: Final reports with documentation of one or more dose reduction techniques (e.g., Au
tomated exposure control, adjustment of the mA and/or kV according to patient size, use of iterative 
reconstruction technique)

 2011 [employer]- All Rights Reserved



Reading location - IP/workstation name: [email or web address]